# Patient Record
Sex: MALE | Race: BLACK OR AFRICAN AMERICAN | NOT HISPANIC OR LATINO | Employment: FULL TIME | URBAN - METROPOLITAN AREA
[De-identification: names, ages, dates, MRNs, and addresses within clinical notes are randomized per-mention and may not be internally consistent; named-entity substitution may affect disease eponyms.]

---

## 2023-07-21 PROBLEM — K42.9 UMBILICAL HERNIA WITHOUT OBSTRUCTION AND WITHOUT GANGRENE: Status: ACTIVE | Noted: 2023-07-21

## 2023-08-22 ENCOUNTER — TELEPHONE (OUTPATIENT)
Dept: SURGERY | Facility: CLINIC | Age: 48
End: 2023-08-22
Payer: COMMERCIAL

## 2023-08-22 NOTE — TELEPHONE ENCOUNTER
Patient called wanting to get his surgery scheduled. He stated he could not continue to support his family with this much pain. He stated he has had 4 CT scans this year and still doesn't have any answers so he is ready to schedule his hernia repair. He stated any day is good.

## 2023-08-24 NOTE — TELEPHONE ENCOUNTER
PER DR CAICEDO HE WANT'S TO KNOW WHO IS TREATING HIM FOR HIS ADRENAL MASS BEFORE SCHEDULING ANY SURGERY. SPOKE TO PATIENT'S WIFE JAVIER (ON RELASE) AND SHE STATES HIS PCP KYLIE OMALLEY HAS BEEN TREATING HIM BUT HAS SINCE REFERRED HIM OUT TO A UROLOGISTS IN [place]. SHE STATES THEY HAVE NOT BEEN CALLED FOR AN APPOINTMENT AS OF YET. SHE IS GOING TO CALL ME BACK WITH THE UROLOGISTS NAME ONCE THE APPOINTMENT IS SCHEDULED.

## 2023-08-30 PROBLEM — M16.31 OSTEOARTHRITIS RESULTING FROM RIGHT HIP DYSPLASIA: Status: ACTIVE | Noted: 2020-07-07

## 2023-08-30 RX ORDER — IBUPROFEN 800 MG/1
TABLET ORAL
COMMUNITY
Start: 2023-07-30 | End: 2023-09-11

## 2023-09-01 ENCOUNTER — OFFICE VISIT (OUTPATIENT)
Dept: UROLOGY | Facility: CLINIC | Age: 48
End: 2023-09-01
Payer: COMMERCIAL

## 2023-09-01 VITALS — BODY MASS INDEX: 34.07 KG/M2 | WEIGHT: 243.4 LBS | HEIGHT: 71 IN | RESPIRATION RATE: 16 BRPM

## 2023-09-01 DIAGNOSIS — E27.8 ADRENAL MASS: Primary | ICD-10-CM

## 2023-09-01 NOTE — PROGRESS NOTES
UROLOGY OFFICE new patient NOTE    Subjective   HPI  Steve Flood is a 48 y.o. male for assessment of adrenal mass. He is accompanied by his wife.    The patient has had a fatty mass on his adrenal gland for approximately 3 years. He has a ventral abdominal hernia that has been seen on a couple of MRIs. He was discussing surgical repair of the hernia with Dr. Ronnie Madrigal MD, who recommended work-up of the mass on his adrenal gland prior to proceeding.     Dr. Alisha Gould MD, ordered a work-up, including MRI with contrast, blood work, and 24-hour urine study, which he reports have been inconclusive.     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  He drinks a significant amount of soda.    Denies history of hypertension; not taking any antihypertensives.  Denies recent weight loss.    _________  MRI abdomen without/with contrast 8/11/2023: Normal right adrenal gland.  Left adrenal gland: Mixed T2 signal intensity, mixed T1 signal intensity nodule with large areas of T2 hyperintensity and T1 hypointensity indicating a partially cystic adrenal nodule, with a mildly thickened wall with enhancement and septations without enhancement measuring 3.3 x 3 cm without signal loss on out of phase imaging.  Indeterminate adrenal nodule.    Labs 8/29/2023  Blood cortisol: 20.7 (expected values: 3.7-19.4 (before 10 AM); 2.9-17.3 (after 5 PM)))  Testosterone 342.3  DHEA 96 ( ug/dl)  Estradiol 26.0 (11-44 pg/mL)  ACTH 87.9 (7.2-63.3 pg/mL)  Potassium 3.9  Creatinine 1.04  Lipase 20 (0-59 U/L)  Amylase 80 (25-1 25 U/L)    Hemoglobin A1c  6/2/2023: 5.6        Medical History:  History reviewed. No pertinent past medical history.     Social History:  Social History     Socioeconomic History    Marital status:    Tobacco Use    Smoking status: Every Day     Packs/day: 1.00     Years: 10.00     Pack years: 10.00     Types: Cigarettes    Smokeless tobacco: Never  "  Vaping Use    Vaping Use: Never used   Substance and Sexual Activity    Alcohol use: Not Currently     Comment: Only on special occasions    Drug use: Never    Sexual activity: Yes     Partners: Female        Family History:  Family History   Problem Relation Age of Onset    Diabetes Mother         Surgical History:  Past Surgical History:   Procedure Laterality Date    JOINT REPLACEMENT          Allergies:  No Known Allergies     Current Medications:  Current Outpatient Medications   Medication Sig Dispense Refill    ibuprofen (ADVIL,MOTRIN) 800 MG tablet TAKE 1/2 TO 1 TABLET BY MOUTH EVERY 6 HOURS AS NEEDED FOR PAIN OR FEVER       No current facility-administered medications for this visit.       Review of systems  A review of systems was performed, and positive findings are noted in the HPI.    Objective     Vital Signs:   Resp 16   Ht 180.3 cm (71\")   Wt 110 kg (243 lb 6.4 oz)   BMI 33.95 kg/m²       Physical exam  No acute distress, well-nourished  Awake alert and oriented  Mood normal; affect normal      Problem List:  Patient Active Problem List   Diagnosis    Umbilical hernia without obstruction and without gangrene    Osteoarthritis resulting from right hip dysplasia       Assessment & Plan   Diagnoses and all orders for this visit:    1. Adrenal mass (Primary)  -     Metanephrines, Frac. Free, Plasma; Future  -     Renin Direct Assay; Future  -     Aldosterone LCMS; Future      Imaging reviewed; likely will warrant surgical excision at time of ventral hernia repair.    - We discussed the patient's symptoms and treatment options. We will order additional testing today in order to determine the next steps in his treatment.    Follow-up in 1 month.   All questions and concerns have been addressed at this time.      Transcribed from ambient dictation for Lupe Russo MD by Shavonne Herman.  09/01/23   15:37 EDT    Patient or patient representative verbalized consent to the visit recording.  I have " personally performed the services described in this document as transcribed by the above individual, and it is both accurate and complete.

## 2023-09-06 ENCOUNTER — LAB (OUTPATIENT)
Dept: LAB | Facility: HOSPITAL | Age: 48
End: 2023-09-06
Payer: COMMERCIAL

## 2023-09-06 DIAGNOSIS — E27.8 ADRENAL MASS: ICD-10-CM

## 2023-09-09 ENCOUNTER — LAB (OUTPATIENT)
Dept: LAB | Facility: HOSPITAL | Age: 48
End: 2023-09-09
Payer: COMMERCIAL

## 2023-09-09 DIAGNOSIS — E27.8 ADRENAL MASS: ICD-10-CM

## 2023-09-09 PROCEDURE — 84244 ASSAY OF RENIN: CPT

## 2023-09-09 PROCEDURE — 82088 ASSAY OF ALDOSTERONE: CPT

## 2023-09-09 PROCEDURE — 36415 COLL VENOUS BLD VENIPUNCTURE: CPT

## 2023-09-09 PROCEDURE — 83835 ASSAY OF METANEPHRINES: CPT

## 2023-09-14 LAB
METANEPH FREE SERPL-MCNC: <25 PG/ML (ref 0–88)
NORMETANEPHRINE SERPL-MCNC: 45.2 PG/ML (ref 0–218.9)

## 2023-09-18 LAB
ALDOST SERPL-MCNC: 3.4 NG/DL
RENIN PLAS-CCNC: 1.63 NG/ML/HR (ref 0.17–5.38)

## 2023-09-28 ENCOUNTER — OFFICE VISIT (OUTPATIENT)
Dept: UROLOGY | Facility: CLINIC | Age: 48
End: 2023-09-28
Payer: COMMERCIAL

## 2023-09-28 VITALS
DIASTOLIC BLOOD PRESSURE: 89 MMHG | SYSTOLIC BLOOD PRESSURE: 161 MMHG | WEIGHT: 253.4 LBS | RESPIRATION RATE: 16 BRPM | HEIGHT: 71 IN | HEART RATE: 71 BPM | BODY MASS INDEX: 35.48 KG/M2

## 2023-09-28 DIAGNOSIS — E27.8 ADRENAL MASS: Primary | ICD-10-CM

## 2023-09-28 NOTE — PROGRESS NOTES
UROLOGY OFFICE follow-up NOTE    Subjective   HPI  Steve Flood is a 48 y.o. male. assessment of adrenal mass. He is accompanied by his wife.     The patient has had a fatty mass on his adrenal gland for approximately 3 years. He has a ventral abdominal hernia that has been seen on a couple of MRIs. He was discussing surgical repair of the hernia with Dr. Ronnie Madrigal MD, who recommended work-up of the mass on his adrenal gland prior to proceeding.      Dr. Alisha Gould MD, ordered a work-up, including MRI with contrast, blood work, and 24-hour urine study, which he reports have been inconclusive.      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  He drinks a significant amount of soda.     Denies history of hypertension; not taking any antihypertensives.  Denies recent weight loss.    Update 9/28/2023: Patient presents for follow-up left adrenal mass, further labs obtained prior.  Patient states he is doing well.  No complaints today.     _________  MRI abdomen without/with contrast 8/11/2023: Normal right adrenal gland.  Left adrenal gland: Mixed T2 signal intensity, mixed T1 signal intensity nodule with large areas of T2 hyperintensity and T1 hypointensity indicating a partially cystic adrenal nodule, with a mildly thickened wall with enhancement and septations without enhancement measuring 3.3 x 3 cm without signal loss on out of phase imaging.  Indeterminate adrenal nodule.     Labs 8/29/2023  Blood cortisol: 20.7 (expected values: 3.7-19.4 (before 10 AM); 2.9-17.3 (after 5 PM)))  Testosterone 342.3  DHEA 96 ( ug/dl)  Estradiol 26.0 (11-44 pg/mL)  ACTH 87.9 (7.2-63.3 pg/mL)  Potassium 3.9  Creatinine 1.04  Lipase 20 (0-59 U/L)  Amylase 80 (25-1 25 U/L)    Labs 9/9/2023  Renin 1.632 (0.167-5.38 ng/mL/h)  Aldosterone 3.4  Normetanephrine 45.2 (0.0-218.9 pg/milliliter)   metanephrine <25 (0.0-88.0 pg/mL)     Hemoglobin A1c  6/2/2023: 5.6      Review  "of systems  A review of systems was performed, and positive findings are noted in the HPI.    Objective     Vital Signs:   /89   Pulse 71   Resp 16   Ht 180.3 cm (71\")   Wt 115 kg (253 lb 6.4 oz)   BMI 35.34 kg/m²       Physical exam  No acute distress, well-nourished  Lungs: Clear, unlabored  CV: Regular rate, no chest retractions  Awake alert and oriented  Mood normal; affect normal    Problem List:  Patient Active Problem List   Diagnosis    Umbilical hernia without obstruction and without gangrene    Osteoarthritis resulting from right hip dysplasia       Assessment & Plan   Diagnoses and all orders for this visit:    1. Adrenal mass (Primary)      Left adrenal mass; imaging reviewed again with patient today in office.  Labs reviewed.  Appears to be hormonally inactive.  Given size, recommend surgical excision at time of hernia repair with general surgery.  This would be performed via robotic assisted laparoscopic left adrenalectomy.  Patient aware that his right adrenal gland appears normal.  Given normal-appearing contralateral adrenal gland, low risk for him to require any type of ongoing treatment for replacement therapy.  Risk, benefits, and alternatives of the procedure were discussed with him at length.  Risks including but not limited to bleeding, infection, damage to surrounding structure, pain, benign or malignant pathology, endocrine changes, and need for further procedures.  Patient also aware that this is a procedure performed under anesthesia which has inherent risks including and up to death.  Patient notes understanding the risks and is agreeable to proceed    Schedule OR, to coordinate scheduling with Dr. Madrigal who plans to perform umbilical hernia repair simultaneously  All questions addressed    "

## 2023-10-10 ENCOUNTER — TELEPHONE (OUTPATIENT)
Dept: UROLOGY | Facility: CLINIC | Age: 48
End: 2023-10-10
Payer: COMMERCIAL

## 2023-10-10 ENCOUNTER — TELEPHONE (OUTPATIENT)
Dept: SURGERY | Facility: CLINIC | Age: 48
End: 2023-10-10
Payer: COMMERCIAL

## 2023-10-10 ENCOUNTER — PREP FOR SURGERY (OUTPATIENT)
Dept: OTHER | Facility: HOSPITAL | Age: 48
End: 2023-10-10
Payer: COMMERCIAL

## 2023-10-10 DIAGNOSIS — K42.9 UMBILICAL HERNIA WITHOUT OBSTRUCTION AND WITHOUT GANGRENE: Primary | ICD-10-CM

## 2023-10-10 RX ORDER — SODIUM CHLORIDE 0.9 % (FLUSH) 0.9 %
10 SYRINGE (ML) INJECTION EVERY 12 HOURS SCHEDULED
OUTPATIENT
Start: 2023-10-10

## 2023-10-10 RX ORDER — CEFAZOLIN SODIUM 2 G/100ML
2000 INJECTION, SOLUTION INTRAVENOUS
OUTPATIENT
Start: 2023-10-11 | End: 2023-10-12

## 2023-10-10 RX ORDER — SODIUM CHLORIDE 9 MG/ML
40 INJECTION, SOLUTION INTRAVENOUS AS NEEDED
OUTPATIENT
Start: 2023-10-10

## 2023-10-10 RX ORDER — SODIUM CHLORIDE, SODIUM LACTATE, POTASSIUM CHLORIDE, CALCIUM CHLORIDE 600; 310; 30; 20 MG/100ML; MG/100ML; MG/100ML; MG/100ML
100 INJECTION, SOLUTION INTRAVENOUS CONTINUOUS
OUTPATIENT
Start: 2023-10-10

## 2023-10-10 RX ORDER — ONDANSETRON 2 MG/ML
4 INJECTION INTRAMUSCULAR; INTRAVENOUS EVERY 6 HOURS PRN
OUTPATIENT
Start: 2023-10-10

## 2023-10-10 RX ORDER — SODIUM CHLORIDE 0.9 % (FLUSH) 0.9 %
10 SYRINGE (ML) INJECTION AS NEEDED
OUTPATIENT
Start: 2023-10-10

## 2023-10-10 NOTE — TELEPHONE ENCOUNTER
The hub called and stated that patient wanted to have the adrenal mass dealt with when his hernia surgery is done

## 2023-10-25 ENCOUNTER — ANESTHESIA EVENT (OUTPATIENT)
Dept: PERIOP | Facility: HOSPITAL | Age: 48
End: 2023-10-25
Payer: COMMERCIAL

## 2023-10-26 NOTE — PRE-PROCEDURE INSTRUCTIONS
IMPORTANT INSTRUCTIONS - PRE-ADMISSION TESTING  DO NOT EAT OR CHEW anything after midnight the night before your procedure.    You may have CLEAR liquids up to 2 hours prior to ARRIVAL time.   Take the following medications the morning of your procedure with JUST A SIP OF WATER: INHALER PRN    DO NOT BRING your medications to the hospital with you, UNLESS something has changed since your PRE-Admission Testing appointment.  Hold all vitamins, supplements, and NSAIDS (Non- steroidal anti-inflammatory meds) for one week prior to surgery (you MAY take Tylenol or Acetaminophen).  If you are diabetic, check your blood sugar the morning of your procedure. If it is less than 70 or if you are feeling symptomatic, call the following number for further instructions: [phone].  Use your inhalers/nebulizers as usual, the morning of your procedure. BRING YOUR INHALERS with you.   Bring your CPAP or BIPAP to hospital, ONLY IF YOU WILL BE SPENDING THE NIGHT.   Make sure you have a ride home and have someone who will stay with you the day of your procedure after you go home.  If you have any questions, please call your Pre-Admission Testing NurseVENKAT at [phone].   Per anesthesia request, do not smoke for 24 hours before your procedure or as instructed by your surgeon.  Clear Liquid Diet        Find out when you need to start a clear liquid diet.   Think of “clear liquids” as anything you could read a newspaper through. This includes things like water, broth, sports drinks, or tea WITHOUT any kind of milk or cream.           Once you are told to start a clear liquid diet, only drink these things until 2 hours before arrival to the hospital or when the hospital says to stop. Total volume limitation: 8 oz.       Clear liquids you CAN drink:   Water   Clear broth: beef, chicken, vegetable, or bone broth with nothing in it   Gatorade   Lemonade or Natanael-aid   Soda   Tea, coffee (NO cream or honey)   Jell-O (without fruit)    Popsicles (without fruit or cream)   Italian ices   Juice without pulp: apple, white, grape   You may use salt, pepper, and sugar    Do NOT drink:   Milk or cream   Soy milk, almond milk, coconut milk, or other non-dairy drinks and   creamers   Milkshakes or smoothies   Tomato juice   Orange juice   Grapefruit juice   Cream soups or any other than broth         Clear Liquid Diet:  Do NOT eat any solid food.  Do NOT eat or suck on mints or candy.  Do NOT chew gum.  Do NOT drink thick liquids like milk or juice with pulp in it.  Do NOT add milk, cream, or anything like soy milk or almond milk to coffee or tea.

## 2023-10-30 ENCOUNTER — ANESTHESIA (OUTPATIENT)
Dept: PERIOP | Facility: HOSPITAL | Age: 48
End: 2023-10-30
Payer: COMMERCIAL

## 2023-10-30 ENCOUNTER — HOSPITAL ENCOUNTER (INPATIENT)
Facility: HOSPITAL | Age: 48
LOS: 1 days | Discharge: HOME | DRG: 615 | End: 2023-10-31
Attending: SURGERY | Admitting: UROLOGY
Payer: COMMERCIAL

## 2023-10-30 DIAGNOSIS — K42.9 UMBILICAL HERNIA WITHOUT OBSTRUCTION AND WITHOUT GANGRENE: ICD-10-CM

## 2023-10-30 DIAGNOSIS — E27.8 ADRENAL MASS: ICD-10-CM

## 2023-10-30 PROCEDURE — 49591 RPR AA HRN 1ST < 3 CM RDC: CPT | Performed by: SPECIALIST/TECHNOLOGIST, OTHER

## 2023-10-30 PROCEDURE — 25010000002 ESMOLOL 100 MG/10ML SOLUTION: Performed by: NURSE ANESTHETIST, CERTIFIED REGISTERED

## 2023-10-30 PROCEDURE — 25010000002 DEXAMETHASONE PER 1 MG: Performed by: NURSE ANESTHETIST, CERTIFIED REGISTERED

## 2023-10-30 PROCEDURE — 25010000002 MORPHINE PER 10 MG: Performed by: UROLOGY

## 2023-10-30 PROCEDURE — 49591 RPR AA HRN 1ST < 3 CM RDC: CPT | Performed by: SURGERY

## 2023-10-30 PROCEDURE — 25010000002 BUPIVACAINE (PF) 0.25 % SOLUTION 10 ML VIAL: Performed by: SURGERY

## 2023-10-30 PROCEDURE — 0WUF4JZ SUPPLEMENT ABDOMINAL WALL WITH SYNTHETIC SUBSTITUTE, PERCUTANEOUS ENDOSCOPIC APPROACH: ICD-10-PCS | Performed by: SURGERY

## 2023-10-30 PROCEDURE — 25810000003 LACTATED RINGERS PER 1000 ML: Performed by: UROLOGY

## 2023-10-30 PROCEDURE — 25010000002 MIDAZOLAM PER 1MG: Performed by: ANESTHESIOLOGY

## 2023-10-30 PROCEDURE — 88307 TISSUE EXAM BY PATHOLOGIST: CPT | Performed by: UROLOGY

## 2023-10-30 PROCEDURE — 25010000002 PROPOFOL 10 MG/ML EMULSION: Performed by: NURSE ANESTHETIST, CERTIFIED REGISTERED

## 2023-10-30 PROCEDURE — 94799 UNLISTED PULMONARY SVC/PX: CPT

## 2023-10-30 PROCEDURE — 25010000002 ONDANSETRON PER 1 MG: Performed by: NURSE ANESTHETIST, CERTIFIED REGISTERED

## 2023-10-30 PROCEDURE — 25010000002 SUGAMMADEX 200 MG/2ML SOLUTION: Performed by: NURSE ANESTHETIST, CERTIFIED REGISTERED

## 2023-10-30 PROCEDURE — 25010000002 MEPERIDINE PER 100 MG: Performed by: NURSE ANESTHETIST, CERTIFIED REGISTERED

## 2023-10-30 PROCEDURE — 0 HYDROMORPHONE 1 MG/ML SOLUTION: Performed by: NURSE ANESTHETIST, CERTIFIED REGISTERED

## 2023-10-30 PROCEDURE — 25810000003 LACTATED RINGERS PER 1000 ML: Performed by: ANESTHESIOLOGY

## 2023-10-30 PROCEDURE — 25010000002 HYDROMORPHONE 1 MG/ML SOLUTION: Performed by: NURSE ANESTHETIST, CERTIFIED REGISTERED

## 2023-10-30 PROCEDURE — 94761 N-INVAS EAR/PLS OXIMETRY MLT: CPT

## 2023-10-30 PROCEDURE — 25010000002 FENTANYL CITRATE (PF) 50 MCG/ML SOLUTION: Performed by: NURSE ANESTHETIST, CERTIFIED REGISTERED

## 2023-10-30 PROCEDURE — 8E0W4CZ ROBOTIC ASSISTED PROCEDURE OF TRUNK REGION, PERCUTANEOUS ENDOSCOPIC APPROACH: ICD-10-PCS | Performed by: UROLOGY

## 2023-10-30 PROCEDURE — 25010000002 CEFAZOLIN IN DEXTROSE 2000 MG/ 100 ML SOLUTION: Performed by: SURGERY

## 2023-10-30 PROCEDURE — C1781 MESH (IMPLANTABLE): HCPCS | Performed by: SURGERY

## 2023-10-30 PROCEDURE — 25010000002 ONDANSETRON PER 1 MG: Performed by: UROLOGY

## 2023-10-30 PROCEDURE — S2900 ROBOTIC SURGICAL SYSTEM: HCPCS | Performed by: SURGERY

## 2023-10-30 PROCEDURE — 60650 LAPAROSCOPY ADRENALECTOMY: CPT | Performed by: UROLOGY

## 2023-10-30 PROCEDURE — 0GT24ZZ RESECTION OF LEFT ADRENAL GLAND, PERCUTANEOUS ENDOSCOPIC APPROACH: ICD-10-PCS | Performed by: UROLOGY

## 2023-10-30 PROCEDURE — 25010000002 KETOROLAC TROMETHAMINE PER 15 MG: Performed by: UROLOGY

## 2023-10-30 DEVICE — ABSORBABLE WOUND CLOSURE DEVICE
Type: IMPLANTABLE DEVICE | Site: ABDOMEN | Status: FUNCTIONAL
Brand: V-LOC 180

## 2023-10-30 DEVICE — CLIP LIG HEMOLOK PA 6CT MD/LG GRN: Type: IMPLANTABLE DEVICE | Site: ABDOMEN | Status: FUNCTIONAL

## 2023-10-30 DEVICE — SEAL HEMO SURG ARISTA/AH ABS/PWDR 3GM: Type: IMPLANTABLE DEVICE | Site: ABDOMEN | Status: FUNCTIONAL

## 2023-10-30 DEVICE — PHASIX ST MESH, CIRCLE
Type: IMPLANTABLE DEVICE | Site: ABDOMEN | Status: FUNCTIONAL
Brand: PHASIX ST MESH

## 2023-10-30 RX ORDER — ONDANSETRON 2 MG/ML
4 INJECTION INTRAMUSCULAR; INTRAVENOUS ONCE AS NEEDED
Status: DISCONTINUED | OUTPATIENT
Start: 2023-10-30 | End: 2023-10-30 | Stop reason: HOSPADM

## 2023-10-30 RX ORDER — OXYCODONE HYDROCHLORIDE 5 MG/1
5 TABLET ORAL
Status: DISCONTINUED | OUTPATIENT
Start: 2023-10-30 | End: 2023-10-30 | Stop reason: HOSPADM

## 2023-10-30 RX ORDER — FENTANYL CITRATE 50 UG/ML
INJECTION, SOLUTION INTRAMUSCULAR; INTRAVENOUS AS NEEDED
Status: DISCONTINUED | OUTPATIENT
Start: 2023-10-30 | End: 2023-10-30 | Stop reason: SURG

## 2023-10-30 RX ORDER — ROCURONIUM BROMIDE 10 MG/ML
INJECTION, SOLUTION INTRAVENOUS AS NEEDED
Status: DISCONTINUED | OUTPATIENT
Start: 2023-10-30 | End: 2023-10-30 | Stop reason: SURG

## 2023-10-30 RX ORDER — PHENYLEPHRINE HCL IN 0.9% NACL 1 MG/10 ML
SYRINGE (ML) INTRAVENOUS AS NEEDED
Status: DISCONTINUED | OUTPATIENT
Start: 2023-10-30 | End: 2023-10-30 | Stop reason: SURG

## 2023-10-30 RX ORDER — NICOTINE 21 MG/24HR
1 PATCH, TRANSDERMAL 24 HOURS TRANSDERMAL
Status: DISCONTINUED | OUTPATIENT
Start: 2023-10-31 | End: 2023-10-30

## 2023-10-30 RX ORDER — SODIUM CHLORIDE 0.9 % (FLUSH) 0.9 %
10 SYRINGE (ML) INJECTION AS NEEDED
Status: DISCONTINUED | OUTPATIENT
Start: 2023-10-30 | End: 2023-10-31 | Stop reason: HOSPADM

## 2023-10-30 RX ORDER — ONDANSETRON 2 MG/ML
4 INJECTION INTRAMUSCULAR; INTRAVENOUS EVERY 6 HOURS PRN
Status: DISCONTINUED | OUTPATIENT
Start: 2023-10-30 | End: 2023-10-31 | Stop reason: HOSPADM

## 2023-10-30 RX ORDER — SODIUM CHLORIDE 9 MG/ML
40 INJECTION, SOLUTION INTRAVENOUS AS NEEDED
Status: DISCONTINUED | OUTPATIENT
Start: 2023-10-30 | End: 2023-10-30 | Stop reason: HOSPADM

## 2023-10-30 RX ORDER — SODIUM CHLORIDE 0.9 % (FLUSH) 0.9 %
10 SYRINGE (ML) INJECTION EVERY 12 HOURS SCHEDULED
Status: DISCONTINUED | OUTPATIENT
Start: 2023-10-30 | End: 2023-10-30 | Stop reason: HOSPADM

## 2023-10-30 RX ORDER — SODIUM CHLORIDE 9 MG/ML
40 INJECTION, SOLUTION INTRAVENOUS AS NEEDED
Status: DISCONTINUED | OUTPATIENT
Start: 2023-10-30 | End: 2023-10-31 | Stop reason: HOSPADM

## 2023-10-30 RX ORDER — LIDOCAINE HYDROCHLORIDE 20 MG/ML
INJECTION, SOLUTION EPIDURAL; INFILTRATION; INTRACAUDAL; PERINEURAL AS NEEDED
Status: DISCONTINUED | OUTPATIENT
Start: 2023-10-30 | End: 2023-10-30 | Stop reason: SURG

## 2023-10-30 RX ORDER — SODIUM CHLORIDE, SODIUM LACTATE, POTASSIUM CHLORIDE, CALCIUM CHLORIDE 600; 310; 30; 20 MG/100ML; MG/100ML; MG/100ML; MG/100ML
9 INJECTION, SOLUTION INTRAVENOUS CONTINUOUS PRN
Status: DISCONTINUED | OUTPATIENT
Start: 2023-10-30 | End: 2023-10-31 | Stop reason: HOSPADM

## 2023-10-30 RX ORDER — NICOTINE 21 MG/24HR
1 PATCH, TRANSDERMAL 24 HOURS TRANSDERMAL
Status: DISCONTINUED | OUTPATIENT
Start: 2023-10-30 | End: 2023-10-31 | Stop reason: HOSPADM

## 2023-10-30 RX ORDER — SODIUM CHLORIDE 0.9 % (FLUSH) 0.9 %
10 SYRINGE (ML) INJECTION AS NEEDED
Status: DISCONTINUED | OUTPATIENT
Start: 2023-10-30 | End: 2023-10-30 | Stop reason: HOSPADM

## 2023-10-30 RX ORDER — MIDAZOLAM HYDROCHLORIDE 2 MG/2ML
2 INJECTION, SOLUTION INTRAMUSCULAR; INTRAVENOUS ONCE
Status: COMPLETED | OUTPATIENT
Start: 2023-10-30 | End: 2023-10-30

## 2023-10-30 RX ORDER — PROPOFOL 10 MG/ML
VIAL (ML) INTRAVENOUS AS NEEDED
Status: DISCONTINUED | OUTPATIENT
Start: 2023-10-30 | End: 2023-10-30 | Stop reason: SURG

## 2023-10-30 RX ORDER — CEFAZOLIN SODIUM 2 G/100ML
2000 INJECTION, SOLUTION INTRAVENOUS
Qty: 100 ML | Refills: 0 | Status: COMPLETED | OUTPATIENT
Start: 2023-10-30 | End: 2023-10-30

## 2023-10-30 RX ORDER — MEPERIDINE HYDROCHLORIDE 25 MG/ML
12.5 INJECTION INTRAMUSCULAR; INTRAVENOUS; SUBCUTANEOUS
Status: DISCONTINUED | OUTPATIENT
Start: 2023-10-30 | End: 2023-10-30 | Stop reason: HOSPADM

## 2023-10-30 RX ORDER — NALOXONE HCL 0.4 MG/ML
0.4 VIAL (ML) INJECTION
Status: DISCONTINUED | OUTPATIENT
Start: 2023-10-30 | End: 2023-10-31 | Stop reason: HOSPADM

## 2023-10-30 RX ORDER — PROMETHAZINE HYDROCHLORIDE 25 MG/1
25 SUPPOSITORY RECTAL ONCE AS NEEDED
Status: DISCONTINUED | OUTPATIENT
Start: 2023-10-30 | End: 2023-10-30 | Stop reason: HOSPADM

## 2023-10-30 RX ORDER — OXYCODONE AND ACETAMINOPHEN 10; 325 MG/1; MG/1
1 TABLET ORAL EVERY 4 HOURS PRN
Status: DISCONTINUED | OUTPATIENT
Start: 2023-10-30 | End: 2023-10-31 | Stop reason: HOSPADM

## 2023-10-30 RX ORDER — ACETAMINOPHEN 650 MG/1
650 SUPPOSITORY RECTAL EVERY 4 HOURS PRN
Status: DISCONTINUED | OUTPATIENT
Start: 2023-10-30 | End: 2023-10-31 | Stop reason: HOSPADM

## 2023-10-30 RX ORDER — ESMOLOL HYDROCHLORIDE 10 MG/ML
INJECTION INTRAVENOUS AS NEEDED
Status: DISCONTINUED | OUTPATIENT
Start: 2023-10-30 | End: 2023-10-30 | Stop reason: SURG

## 2023-10-30 RX ORDER — DOCUSATE SODIUM 100 MG/1
100 CAPSULE, LIQUID FILLED ORAL 2 TIMES DAILY PRN
Status: DISCONTINUED | OUTPATIENT
Start: 2023-10-30 | End: 2023-10-31 | Stop reason: HOSPADM

## 2023-10-30 RX ORDER — ONDANSETRON 2 MG/ML
INJECTION INTRAMUSCULAR; INTRAVENOUS AS NEEDED
Status: DISCONTINUED | OUTPATIENT
Start: 2023-10-30 | End: 2023-10-30 | Stop reason: SURG

## 2023-10-30 RX ORDER — ONDANSETRON 4 MG/1
4 TABLET, FILM COATED ORAL EVERY 6 HOURS PRN
Status: DISCONTINUED | OUTPATIENT
Start: 2023-10-30 | End: 2023-10-31 | Stop reason: HOSPADM

## 2023-10-30 RX ORDER — MAGNESIUM HYDROXIDE 1200 MG/15ML
LIQUID ORAL AS NEEDED
Status: DISCONTINUED | OUTPATIENT
Start: 2023-10-30 | End: 2023-10-30 | Stop reason: HOSPADM

## 2023-10-30 RX ORDER — OXYCODONE HYDROCHLORIDE AND ACETAMINOPHEN 5; 325 MG/1; MG/1
1 TABLET ORAL EVERY 4 HOURS PRN
Status: DISCONTINUED | OUTPATIENT
Start: 2023-10-30 | End: 2023-10-31 | Stop reason: HOSPADM

## 2023-10-30 RX ORDER — PROMETHAZINE HYDROCHLORIDE 12.5 MG/1
25 TABLET ORAL ONCE AS NEEDED
Status: DISCONTINUED | OUTPATIENT
Start: 2023-10-30 | End: 2023-10-30 | Stop reason: HOSPADM

## 2023-10-30 RX ORDER — ACETAMINOPHEN 325 MG/1
650 TABLET ORAL EVERY 4 HOURS PRN
Status: DISCONTINUED | OUTPATIENT
Start: 2023-10-30 | End: 2023-10-31 | Stop reason: HOSPADM

## 2023-10-30 RX ORDER — ALBUTEROL SULFATE 90 UG/1
2 AEROSOL, METERED RESPIRATORY (INHALATION) EVERY 4 HOURS PRN
Status: DISCONTINUED | OUTPATIENT
Start: 2023-10-30 | End: 2023-10-31 | Stop reason: HOSPADM

## 2023-10-30 RX ORDER — SODIUM CHLORIDE, SODIUM LACTATE, POTASSIUM CHLORIDE, CALCIUM CHLORIDE 600; 310; 30; 20 MG/100ML; MG/100ML; MG/100ML; MG/100ML
100 INJECTION, SOLUTION INTRAVENOUS CONTINUOUS
Status: DISCONTINUED | OUTPATIENT
Start: 2023-10-30 | End: 2023-10-31

## 2023-10-30 RX ORDER — IBUPROFEN 800 MG/1
800 TABLET ORAL EVERY 8 HOURS PRN
COMMUNITY
Start: 2023-10-17

## 2023-10-30 RX ORDER — MORPHINE SULFATE 2 MG/ML
2 INJECTION, SOLUTION INTRAMUSCULAR; INTRAVENOUS
Status: DISCONTINUED | OUTPATIENT
Start: 2023-10-30 | End: 2023-10-31 | Stop reason: HOSPADM

## 2023-10-30 RX ORDER — GLYCOPYRROLATE 0.2 MG/ML
INJECTION INTRAMUSCULAR; INTRAVENOUS AS NEEDED
Status: DISCONTINUED | OUTPATIENT
Start: 2023-10-30 | End: 2023-10-30 | Stop reason: SURG

## 2023-10-30 RX ORDER — SODIUM CHLORIDE 0.9 % (FLUSH) 0.9 %
10 SYRINGE (ML) INJECTION EVERY 12 HOURS SCHEDULED
Status: DISCONTINUED | OUTPATIENT
Start: 2023-10-30 | End: 2023-10-31 | Stop reason: HOSPADM

## 2023-10-30 RX ORDER — ALBUTEROL SULFATE 90 UG/1
2 AEROSOL, METERED RESPIRATORY (INHALATION) EVERY 4 HOURS PRN
COMMUNITY

## 2023-10-30 RX ORDER — ACETAMINOPHEN 500 MG
1000 TABLET ORAL ONCE
Status: COMPLETED | OUTPATIENT
Start: 2023-10-30 | End: 2023-10-30

## 2023-10-30 RX ORDER — DEXMEDETOMIDINE HYDROCHLORIDE 100 UG/ML
INJECTION, SOLUTION INTRAVENOUS AS NEEDED
Status: DISCONTINUED | OUTPATIENT
Start: 2023-10-30 | End: 2023-10-30 | Stop reason: SURG

## 2023-10-30 RX ORDER — DEXAMETHASONE SODIUM PHOSPHATE 4 MG/ML
INJECTION, SOLUTION INTRA-ARTICULAR; INTRALESIONAL; INTRAMUSCULAR; INTRAVENOUS; SOFT TISSUE AS NEEDED
Status: DISCONTINUED | OUTPATIENT
Start: 2023-10-30 | End: 2023-10-30 | Stop reason: SURG

## 2023-10-30 RX ORDER — KETOROLAC TROMETHAMINE 15 MG/ML
15 INJECTION, SOLUTION INTRAMUSCULAR; INTRAVENOUS EVERY 6 HOURS PRN
Status: DISCONTINUED | OUTPATIENT
Start: 2023-10-30 | End: 2023-10-31 | Stop reason: HOSPADM

## 2023-10-30 RX ADMIN — FENTANYL CITRATE 100 MCG: 50 INJECTION, SOLUTION INTRAMUSCULAR; INTRAVENOUS at 19:20

## 2023-10-30 RX ADMIN — ROCURONIUM BROMIDE 30 MG: 50 INJECTION INTRAVENOUS at 17:01

## 2023-10-30 RX ADMIN — MEPERIDINE HYDROCHLORIDE 12.5 MG: 25 INJECTION INTRAMUSCULAR; INTRAVENOUS; SUBCUTANEOUS at 19:42

## 2023-10-30 RX ADMIN — LIDOCAINE HYDROCHLORIDE 50 MG: 20 INJECTION, SOLUTION EPIDURAL; INFILTRATION; INTRACAUDAL; PERINEURAL at 15:17

## 2023-10-30 RX ADMIN — FENTANYL CITRATE 50 MCG: 50 INJECTION, SOLUTION INTRAMUSCULAR; INTRAVENOUS at 16:17

## 2023-10-30 RX ADMIN — ESMOLOL HYDROCHLORIDE 20 MG: 100 INJECTION, SOLUTION INTRAVENOUS at 18:25

## 2023-10-30 RX ADMIN — NICOTINE 1 PATCH: 21 PATCH, EXTENDED RELEASE TRANSDERMAL at 23:25

## 2023-10-30 RX ADMIN — HYDROMORPHONE HYDROCHLORIDE 0.5 MG: 1 INJECTION, SOLUTION INTRAMUSCULAR; INTRAVENOUS; SUBCUTANEOUS at 19:42

## 2023-10-30 RX ADMIN — ONDANSETRON 4 MG: 2 INJECTION INTRAMUSCULAR; INTRAVENOUS at 19:00

## 2023-10-30 RX ADMIN — PROPOFOL 200 MG: 10 INJECTION, EMULSION INTRAVENOUS at 15:17

## 2023-10-30 RX ADMIN — DEXMEDETOMIDINE 20 MCG: 100 INJECTION, SOLUTION INTRAVENOUS at 16:49

## 2023-10-30 RX ADMIN — HYDROMORPHONE HYDROCHLORIDE 0.5 MG: 1 INJECTION, SOLUTION INTRAMUSCULAR; INTRAVENOUS; SUBCUTANEOUS at 15:18

## 2023-10-30 RX ADMIN — HYDROMORPHONE HYDROCHLORIDE 0.25 MG: 1 INJECTION, SOLUTION INTRAMUSCULAR; INTRAVENOUS; SUBCUTANEOUS at 20:30

## 2023-10-30 RX ADMIN — Medication 200 MCG: at 15:40

## 2023-10-30 RX ADMIN — SODIUM CHLORIDE, POTASSIUM CHLORIDE, SODIUM LACTATE AND CALCIUM CHLORIDE 9 ML/HR: 600; 310; 30; 20 INJECTION, SOLUTION INTRAVENOUS at 12:02

## 2023-10-30 RX ADMIN — ESMOLOL HYDROCHLORIDE 20 MG: 100 INJECTION, SOLUTION INTRAVENOUS at 17:08

## 2023-10-30 RX ADMIN — ROCURONIUM BROMIDE 20 MG: 50 INJECTION INTRAVENOUS at 18:00

## 2023-10-30 RX ADMIN — DEXAMETHASONE SODIUM PHOSPHATE 8 MG: 4 INJECTION, SOLUTION INTRAMUSCULAR; INTRAVENOUS at 15:35

## 2023-10-30 RX ADMIN — SODIUM CHLORIDE, POTASSIUM CHLORIDE, SODIUM LACTATE AND CALCIUM CHLORIDE 100 ML/HR: 600; 310; 30; 20 INJECTION, SOLUTION INTRAVENOUS at 23:26

## 2023-10-30 RX ADMIN — MORPHINE SULFATE 2 MG: 2 INJECTION, SOLUTION INTRAMUSCULAR; INTRAVENOUS at 21:08

## 2023-10-30 RX ADMIN — GLYCOPYRROLATE 0.2 MG: 0.2 INJECTION INTRAMUSCULAR; INTRAVENOUS at 15:16

## 2023-10-30 RX ADMIN — ONDANSETRON 4 MG: 2 INJECTION INTRAMUSCULAR; INTRAVENOUS at 21:12

## 2023-10-30 RX ADMIN — ROCURONIUM BROMIDE 30 MG: 50 INJECTION INTRAVENOUS at 15:56

## 2023-10-30 RX ADMIN — CEFAZOLIN SODIUM 2000 MG: 2 INJECTION, SOLUTION INTRAVENOUS at 19:15

## 2023-10-30 RX ADMIN — ACETAMINOPHEN 1000 MG: 500 TABLET ORAL at 12:02

## 2023-10-30 RX ADMIN — MIDAZOLAM HYDROCHLORIDE 2 MG: 1 INJECTION, SOLUTION INTRAMUSCULAR; INTRAVENOUS at 14:37

## 2023-10-30 RX ADMIN — FENTANYL CITRATE 50 MCG: 50 INJECTION, SOLUTION INTRAMUSCULAR; INTRAVENOUS at 16:44

## 2023-10-30 RX ADMIN — KETOROLAC TROMETHAMINE 15 MG: 15 INJECTION, SOLUTION INTRAMUSCULAR; INTRAVENOUS at 23:26

## 2023-10-30 RX ADMIN — SUGAMMADEX 200 MG: 100 INJECTION, SOLUTION INTRAVENOUS at 19:01

## 2023-10-30 RX ADMIN — HYDROMORPHONE HYDROCHLORIDE 0.5 MG: 1 INJECTION, SOLUTION INTRAMUSCULAR; INTRAVENOUS; SUBCUTANEOUS at 19:50

## 2023-10-30 RX ADMIN — FENTANYL CITRATE 100 MCG: 50 INJECTION, SOLUTION INTRAMUSCULAR; INTRAVENOUS at 18:51

## 2023-10-30 RX ADMIN — CEFAZOLIN SODIUM 2000 MG: 2 INJECTION, SOLUTION INTRAVENOUS at 15:15

## 2023-10-30 RX ADMIN — ROCURONIUM BROMIDE 70 MG: 50 INJECTION INTRAVENOUS at 15:17

## 2023-10-30 RX ADMIN — DEXMEDETOMIDINE 30 MCG: 100 INJECTION, SOLUTION INTRAVENOUS at 17:06

## 2023-10-30 RX ADMIN — Medication 10 ML: at 23:26

## 2023-10-30 NOTE — OP NOTE
Preoperative diagnosis  Left adrenal mass    Postoperative diagnosis  Left adrenal mass    Procedure performed  Left robotic assisted laparoscopic adrenalectomy    Attending surgeon  Lupe Russo MD     Assistant  Tod Parr RN    Anesthesia  General    EBL  25 mL    Complications  None    Specimen  Left adrenal    Findings  Left adrenal gland with adjacent cystic structure with visible adrenal tissue; completely excised    Indications  48 y.o. male agreed to undergo the above named procedure after discussion of the alternatives, risks and benefits.   Informed consent was obtained.      Procedure  The patient properly identified, brought to the Operating Room. Following induction of general anesthesia, the patient was placed in the left flank position at the intersection to the twelfth rib. At the umbilicus, a small vertical incision was made with an 11 blade scalpel, through which we gained pneumoperitoneum with help of a Veress needle. A drop test was used to confirm its proper placement. Visual trocar with 0 degree laparoscopic lens was used to obtain access into the abdomen. There was no evidence of bowel or vascular injury. There were no adhesions and careful inspection was performed prior to insertion of the ports. In a triangular fashion towards the left upper quadrant, two 8 millimeter robot trocars were placed. In the mid-line just above the camera port, a 5 millimeter assistant port was placed. We docked the robot using a 30 degree  lens. The colon was mobilized along the white line of Toldt. Once this was reflected, identification of the kidney and adrenal gland was made. The adrenal gland with a large cystic structure was circumferentially dissected until freed.  The adrenal vein was controlled with the vessel sealer and clips.  It was then placed in an endocatch bag. Repeated inspection of the hilum, there was adequate hemostasis after observing for several minutes; the field was covered with  Elo hemostatic agent.  The robot was then undocked.   The case was then passed to the general surgery team, Dr. Madrigal, who will extract the specimen and perform robotic umbilical hernia repair.  Please see subsequent operative report for details of the remainder of the procedure.   All needle and sponge counts were correct x 2 for this portion of the procedure.     The first assist, Tod Parr RN, was present and actively participated throughout the case.          Signed:  Lupe Russo MD  10/30/23  17:44 EDT

## 2023-10-30 NOTE — ANESTHESIA POSTPROCEDURE EVALUATION
Patient: Steve Flood    Procedure Summary       Date: 10/30/23 Room / Location: [facility] OR 08 / [facility] MAIN OR    Anesthesia Start: 1507 Anesthesia Stop: 1938    Procedures:       UMBILICAL HERNIA REPAIR LAPAROSCOPIC WITH DAVINCI ROBOT (Abdomen)      ADRENALECTOMY LAPAROSCOPIC WITH DAVINCI ROBOT (Left: Abdomen) Diagnosis:       Umbilical hernia without obstruction and without gangrene      (Umbilical hernia without obstruction and without gangrene [K42.9])    Surgeons: Ronnie Madrigal MD; Lupe Russo MD Provider: Radha Regalado CRNA    Anesthesia Type: general ASA Status: 2            Anesthesia Type: general    Vitals  Vitals Value Taken Time   /88 10/30/23 1945   Temp 36.2 °C (97.1 °F) 10/30/23 1930   Pulse 77 10/30/23 1949   Resp 12 10/30/23 1945   SpO2 96 % 10/30/23 1946   Vitals shown include unfiled device data.        Post Anesthesia Care and Evaluation    Patient location during evaluation: bedside  Patient participation: complete - patient participated  Level of consciousness: awake  Pain management: adequate    Airway patency: patent  PONV Status: none  Cardiovascular status: acceptable and stable  Respiratory status: acceptable  Hydration status: acceptable    Comments: An Anesthesiologist personally participated in the most demanding procedures (including induction and emergence if applicable) in the anesthesia plan, monitored the course of anesthesia administration at frequent intervals and remained physically present and available for immediate diagnosis and treatment of emergencies.

## 2023-10-30 NOTE — OP NOTE
Preoperative diagnosis: Umbilical hernia    Postoperative diagnosis: Same    Findings: 2 cm umbilical hernia    Procedure: Robotic umbilical hernia repair    Surgeon: Rohan    Anesthesia: General    Assistant: Valerie Alvarez    EBL: 3 mL from our portion of the case    Specimens: None    Complications: None    Indications: 48-year-old male who presented with an umbilical hernia as well as a adrenal mass.  Presents today for elective hernia repair and adrenalectomy.    PROCEDURE    Patient was seen in the preoperative holding area.  Risks, benefits, alternatives of the operation were again discussed in detail.  All of the patient and family's questions were answered.  They voiced understanding, and agreed to proceed.    Patient was brought to the operating room.  Monitoring devices and [place] stockings were placed.  Anesthesia was administered.  Patient was prepped and draped in standard surgical fashion.  After prepping and draping a timeout was performed to verify both the correct patient and correct procedure.    We assumed the case from Dr. Russo after adrenalectomy had been performed.  We were able to use 2 of her trocars for the robotic arms but I did have to reposition the camera port because it was too close to the hernia defect.  After injection of local anesthesia under direct visualization an 8 mm trocar was placed in the left lateral side and this was repositioned at one other time to make space for the camera and the mesh.    We then brought in and docked the robot.  Once the robot was in appropriate position, we began by reducing any contents that were incarcerated in the umbilical hernia.  We then dissected into the preperitoneal space, making sure we reduced all contents from the umbilical hernia.  Once everything was completely reduced from the umbilical hernia and we had a complete and adequate exposure, we closed the hernia defect primarily with a running 0 V-Loc suture.  Once the defect was  closed we then brought in a piece of phasix ST mesh.  It was [place] centimeters.  A chandelier stitch had been placed in the mesh, the chandelier stitch was grasped with a Reymundo-Glen device through the center of the umbilical defect and pulled up flush with the anterior abdominal wall.  The mesh was then sewn into place circumferentially with multiple 3-0 V-Loc sutures.  We inspected the mesh to make sure it was sewn in 360 degrees with no gaps or folds.  Satisfied with the primary repair and coverage with the mesh, we then inspected the abdomen making sure everything appeared to be hemostatic and there were no obvious underlying injuries.  We then removed the robotic arms and undocked the robot.    Dr. Russo had left the specimen in situ.  I remove the specimen through the left upper quadrant trocar.  I did have to extend the incision slightly in order to get the specimen out.  Specimen came out tacked in the bag.  Specimen was passed off for pathology.  We closed this site and a couple of the 8 mm trocar sites with a Reymundo-Glen device and 0 Vicryl suture under direct visualization.  The remaining trocars were then removed under direct visualization, and the abdomen was desufflated.  Skin incisions were closed with subcuticular 4-0 Vicryl stitch and dressed with skin glue.    The patient was then aroused from anesthesia, taken off the OR table, and taken to PACU in stable condition.  Sponge, needle, and instrument counts were correct x2.  Valerie Alvarez was present for the entire procedure and helped in all portions of the case.    Assistant: Tod Parr RN CSA; Valerie Alvarez CSA was responsible for performing the following activities: Retraction, Suturing, Closing, Placing Dressing, and Held/Positioned Camera and their skilled assistance was necessary for the success of this case.        The operative note was dictated with the help of the dragon dictation system.

## 2023-10-30 NOTE — PROGRESS NOTES
Plan to perform left robotic adrenalectomy in combination with Dr. Madrigal.  Please see surgery H&P for details.  Risks, benefits, and alternatives were discussed with him at length prior to today.  Adequate time was provided for questions.  Hormonal work-up was performed and was negative.  Patient aware that his right adrenal gland appears normal.  Given normal-appearing contralateral adrenal gland, low risk for him to require any type of ongoing treatment for replacement therapy.  Risks discussed including but not limited to bleeding, infection, damage to surrounding structure, pain, benign or malignant pathology, endocrine changes, and need for further procedures.  Patient also aware that this is a procedure performed under anesthesia which has inherent risks including and up to death.  Patient notes understanding the risks and is agreeable to proceed.  All questions addressed

## 2023-10-30 NOTE — H&P
"Steve Flood is a 48 y.o. male presents to [facility] GENERAL SURGERY. The patient is to be seen for umbilical hernia.     The patient is to be seen for a large umbilical hernia on CT.     Umbilical hernia  The patient reports he has a large umbilical hernia. He states he was told it was a 3 cm mass on his right side. The patient reports he has been dealing with this pain. He states he is trying to get another CT scan with it. The patient reports he is more worried about that than the hernia. He states he has seen Dr. Gould for his adrenal mass. The patient reports he has an appointment scheduled on 08/01/2023 to get an MRI. He states he saw a surgeon in [place] and was informed that surgery would be 4 to 6 weeks at the earliest. The patient reports he took the MRI and they saw the hernia. He states they were going to call him with scheduling for everything, but the hernia was taking an number of weeks. The patient reports he was told he was seeing someone at [facility] for the adrenal masses.           Objective   Medical History   History reviewed. No pertinent past medical history.        Surgical History         Past Surgical History:   Procedure Laterality Date    JOINT REPLACEMENT                No current outpatient medications on file.     No Known Allergies            Family History   Problem Relation Age of Onset    Diabetes Mother           Social History   Social History            Socioeconomic History    Marital status:    Tobacco Use    Smoking status: Every Day       Packs/day: 1.00       Years: 10.00       Pack years: 10.00       Types: Cigarettes    Smokeless tobacco: Never   Vaping Use    Vaping Use: Never used   Substance and Sexual Activity    Alcohol use: Not Currently       Comment: Only on special occasions    Drug use: Never    Sexual activity: Yes       Partners: Female            Vital Signs:   Resp 16   Ht 177.8 cm (70\")   BMI 35.02 kg/m²    "   Physical Exam  Constitutional:       General: He is not in acute distress.  Pulmonary:      Breath sounds: Normal breath sounds.   Abdominal:      Palpations: Abdomen is soft.         It likely has an inferior to the umbilicus hernia. Given body habitus, it actually was not clear. I could not clearly feel a umbilical hernia, but I did feel like he had some bulging just inferior to the umbilicus, but his umbilicus actually appeared normal, but he does have a CT which shows a large fat containing umbilical hernia that was from [facility].           Result Review   :               Procedures            Assessment   Assessment and Plan    There are no diagnoses linked to this encounter.     1. Patient with a symptomatic umbilical hernia.     - Patient has a fairly large 3 cm adrenal mass and I am not sure how this is being addressed or worked up. We will need to get in touch with his PCP and see if they have made any referrals for this adrenal mass. I think if there is plans to operate on the adrenal mass, we could potentially do his umbilical hernia repair in conjunction with the adrenalectomy, but he likely needs some work-up for that prior to getting him scheduled for an umbilical hernia repair. Risks, benefits, alternatives of the umbilical hernia repair were discussed extensively. All questions were answered. Patient voiced understanding and agreed to proceed with the above plan.        Follow Up   No follow-ups on file.  Patient was given instructions and counseling regarding his condition or for health maintenance advice. Please see specific information pulled into the AVS if appropriate.         Transcribed from ambient dictation for Ronnie Madrigal MD by Ludwin Canchola.  07/20/23   12:38 EDT     Patient or patient representative verbalized consent to the visit recording.  I have personally performed the services described in this document as transcribed by the above individual, and it is both  accurate and complete.

## 2023-10-31 ENCOUNTER — READMISSION MANAGEMENT (OUTPATIENT)
Dept: CALL CENTER | Facility: HOSPITAL | Age: 48
End: 2023-10-31
Payer: COMMERCIAL

## 2023-10-31 VITALS
WEIGHT: 258.6 LBS | HEART RATE: 55 BPM | OXYGEN SATURATION: 98 % | SYSTOLIC BLOOD PRESSURE: 143 MMHG | RESPIRATION RATE: 16 BRPM | DIASTOLIC BLOOD PRESSURE: 76 MMHG | HEIGHT: 72 IN | TEMPERATURE: 98.1 F | BODY MASS INDEX: 35.03 KG/M2

## 2023-10-31 LAB
ANION GAP SERPL CALCULATED.3IONS-SCNC: 7.5 MMOL/L (ref 5–15)
BASOPHILS # BLD AUTO: 0.01 10*3/MM3 (ref 0–0.2)
BASOPHILS NFR BLD AUTO: 0.1 % (ref 0–1.5)
BUN SERPL-MCNC: 10 MG/DL (ref 6–20)
BUN/CREAT SERPL: 10.6 (ref 7–25)
CALCIUM SPEC-SCNC: 8.8 MG/DL (ref 8.6–10.5)
CHLORIDE SERPL-SCNC: 100 MMOL/L (ref 98–107)
CO2 SERPL-SCNC: 27.5 MMOL/L (ref 22–29)
CREAT SERPL-MCNC: 0.94 MG/DL (ref 0.76–1.27)
DEPRECATED RDW RBC AUTO: 48.8 FL (ref 37–54)
EGFRCR SERPLBLD CKD-EPI 2021: 100.6 ML/MIN/1.73
EOSINOPHIL # BLD AUTO: 0 10*3/MM3 (ref 0–0.4)
EOSINOPHIL NFR BLD AUTO: 0 % (ref 0.3–6.2)
ERYTHROCYTE [DISTWIDTH] IN BLOOD BY AUTOMATED COUNT: 13.8 % (ref 12.3–15.4)
GLUCOSE SERPL-MCNC: 98 MG/DL (ref 65–99)
HCT VFR BLD AUTO: 39.1 % (ref 37.5–51)
HGB BLD-MCNC: 12.9 G/DL (ref 13–17.7)
IMM GRANULOCYTES # BLD AUTO: 0.04 10*3/MM3 (ref 0–0.05)
IMM GRANULOCYTES NFR BLD AUTO: 0.4 % (ref 0–0.5)
LYMPHOCYTES # BLD AUTO: 1.13 10*3/MM3 (ref 0.7–3.1)
LYMPHOCYTES NFR BLD AUTO: 12.6 % (ref 19.6–45.3)
MCH RBC QN AUTO: 31.3 PG (ref 26.6–33)
MCHC RBC AUTO-ENTMCNC: 33 G/DL (ref 31.5–35.7)
MCV RBC AUTO: 94.9 FL (ref 79–97)
MONOCYTES # BLD AUTO: 0.47 10*3/MM3 (ref 0.1–0.9)
MONOCYTES NFR BLD AUTO: 5.3 % (ref 5–12)
NEUTROPHILS NFR BLD AUTO: 7.29 10*3/MM3 (ref 1.7–7)
NEUTROPHILS NFR BLD AUTO: 81.6 % (ref 42.7–76)
NRBC BLD AUTO-RTO: 0 /100 WBC (ref 0–0.2)
PLATELET # BLD AUTO: 170 10*3/MM3 (ref 140–450)
PMV BLD AUTO: 11.8 FL (ref 6–12)
POTASSIUM SERPL-SCNC: 5.4 MMOL/L (ref 3.5–5.2)
RBC # BLD AUTO: 4.12 10*6/MM3 (ref 4.14–5.8)
SODIUM SERPL-SCNC: 135 MMOL/L (ref 136–145)
WBC NRBC COR # BLD: 8.94 10*3/MM3 (ref 3.4–10.8)

## 2023-10-31 PROCEDURE — 85025 COMPLETE CBC W/AUTO DIFF WBC: CPT | Performed by: UROLOGY

## 2023-10-31 PROCEDURE — 99231 SBSQ HOSP IP/OBS SF/LOW 25: CPT | Performed by: SURGERY

## 2023-10-31 PROCEDURE — 80048 BASIC METABOLIC PNL TOTAL CA: CPT | Performed by: UROLOGY

## 2023-10-31 RX ORDER — HYDROCODONE BITARTRATE AND ACETAMINOPHEN 5; 325 MG/1; MG/1
1 TABLET ORAL EVERY 4 HOURS PRN
Qty: 20 TABLET | Refills: 0 | Status: SHIPPED | OUTPATIENT
Start: 2023-10-31

## 2023-10-31 RX ORDER — DOCUSATE SODIUM 100 MG/1
100 CAPSULE, LIQUID FILLED ORAL 2 TIMES DAILY PRN
Qty: 10 CAPSULE | Refills: 1 | Status: SHIPPED | OUTPATIENT
Start: 2023-10-31 | End: 2024-10-30

## 2023-10-31 RX ADMIN — OXYCODONE AND ACETAMINOPHEN 1 TABLET: 5; 325 TABLET ORAL at 06:20

## 2023-10-31 NOTE — PROGRESS NOTES
[facility]     Progress Note    Patient Name: Steve Flood  : 1975  MRN: [id number]  Primary Care Physician:  Alisha Gould MD  Date of admission: 10/30/2023    Subjective   Subjective     Chief Complaint: Umbilical hernia    HPI:  Patient Reports feeling well today.  Tolerating diet.  Pain is well controlled.  Was up and dressed and ready to go at the time I evaluated him.      Objective   Objective     Vitals:   Temp:  [97.1 °F (36.2 °C)-98.9 °F (37.2 °C)] 98.1 °F (36.7 °C)  Heart Rate:  [49-90] 55  Resp:  [11-18] 16  BP: (140-190)/() 143/76  FiO2 (%):  [85 %] 85 %    Physical Exam  Constitutional:       Appearance: Normal appearance.   Cardiovascular:      Rate and Rhythm: Normal rate.   Pulmonary:      Effort: Pulmonary effort is normal.   Abdominal:      Palpations: Abdomen is soft.         Result Review    Result Review:  I have personally reviewed the results from the time of this admission to 10/31/2023 09:56 EDT and agree with these findings:  [x]  Laboratory  []  Microbiology  []  Radiology  []  EKG/Telemetry   []  Cardiology/Vascular   []  Pathology  []  Old records  []  Other:  Most notable findings include: White count normal    Assessment & Plan   Assessment / Plan     Brief Patient Summary:  Steve Flood is a 48 y.o. 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    CODE STATUS:   Code Status (Patient has no pulse and is not breathing): CPR (Attempt to Resuscitate)  Medical Interventions (Patient has pulse or is breathing): Full  Support    Disposition:  I expect patient to be discharged today.    Electronically signed by Ronnie Madrigal MD, 10/31/23, 9:56 AM EDT.

## 2023-10-31 NOTE — SIGNIFICANT NOTE
10/31/23 5759   Plan   Plan SABINO, RN met with patient at bedside.  Patient ambulating in room with no assist.  Reports lives with his spouse.  Works and provides own IADL's.  Facesheet verified.  Reports no financial needs.  Meds to Beds.  Plans to dishcarge home with spouse and no needs

## 2023-10-31 NOTE — PLAN OF CARE
Goal Outcome Evaluation:  Plan of Care Reviewed With: patient        Progress: improving       Patient alert and able to make needs known. No current complaint of pain or discomfort. Discharge education complete and understanding voiced.

## 2023-10-31 NOTE — DISCHARGE SUMMARY
[facility]   DISCHARGE SUMMARY      Name:  Steve Flood   Age:  48 y.o.  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  He tolerated procedure well.  Please see operative report for further details.  Patient was admitted postoperatively for pain control and monitoring.  Patient remained stable during admission.  Postoperatively he was able to ambulate as well as tolerate a diet.   At time of discharge patient was ambulating without assist, tolerating regular diet, and pain was controlled with p.o. medications.  At this time all goals of inpatient care meant patient is deemed ready for discharge home     Procedures Performed:    Procedure(s):  UMBILICAL HERNIA REPAIR LAPAROSCOPIC WITH DAVINCI ROBOT  ADRENALECTOMY LAPAROSCOPIC WITH DAVINCI ROBOT       Consults:     Consults       No orders found for last 30 day(s).            Pertinent Test Results:     Lab Results (all)       Procedure Component Value Units Date/Time    Tissue Pathology Exam [[id number]] Collected: 10/30/23 1722    Specimen: Tissue from Gland, Adrenal Left Updated: 10/31/23 0836    Basic Metabolic Panel [[id number]]  (Abnormal) Collected: 10/31/23 0408    Specimen: Blood Updated: 10/31/23 0556     Glucose 98 mg/dL      BUN 10 mg/dL       "Creatinine 0.94 mg/dL      Sodium 135 mmol/L      Potassium 5.4 mmol/L      Chloride 100 mmol/L      CO2 27.5 mmol/L      Calcium 8.8 mg/dL      BUN/Creatinine Ratio 10.6     Anion Gap 7.5 mmol/L      eGFR 100.6 mL/min/1.73     Narrative:      GFR Normal >60  Chronic Kidney Disease <60  Kidney Failure <15      CBC & Differential [[id number]]  (Abnormal) Collected: 10/31/23 0408    Specimen: Blood Updated: 10/31/23 0528    Narrative:      The following orders were created for panel order CBC & Differential.  Procedure                               Abnormality         Status                     ---------                               -----------         ------                     CBC Auto Differential[[id number]]        Abnormal            Final result                 Please view results for these tests on the individual orders.    CBC Auto Differential [[id number]]  (Abnormal) Collected: 10/31/23 0408    Specimen: Blood Updated: 10/31/23 0528     WBC 8.94 10*3/mm3      RBC 4.12 10*6/mm3      Hemoglobin 12.9 g/dL      Hematocrit 39.1 %      MCV 94.9 fL      MCH 31.3 pg      MCHC 33.0 g/dL      RDW 13.8 %      RDW-SD 48.8 fl      MPV 11.8 fL      Platelets 170 10*3/mm3      Neutrophil % 81.6 %      Lymphocyte % 12.6 %      Monocyte % 5.3 %      Eosinophil % 0.0 %      Basophil % 0.1 %      Immature Grans % 0.4 %      Neutrophils, Absolute 7.29 10*3/mm3      Lymphocytes, Absolute 1.13 10*3/mm3      Monocytes, Absolute 0.47 10*3/mm3      Eosinophils, Absolute 0.00 10*3/mm3      Basophils, Absolute 0.01 10*3/mm3      Immature Grans, Absolute 0.04 10*3/mm3      nRBC 0.0 /100 WBC             Imaging Results (All)       None            Condition on Discharge:      good    Vital Signs:    /76 (BP Location: Left arm, Patient Position: Lying)   Pulse 55   Temp 98.1 °F (36.7 °C) (Oral)   Resp 16   Ht 182.9 cm (72\")   Wt 117 kg (258 lb 9.6 oz)   SpO2 98%   BMI 35.07 kg/m²     Discharge Disposition:    Home or Self " Care    Discharge Medication:       Discharge Medications        New Medications        Instructions Start Date   docusate sodium 100 MG capsule  Commonly known as: Colace   100 mg, Oral, 2 Times Daily PRN      HYDROcodone-acetaminophen 5-325 MG per tablet  Commonly known as: NORCO   1 tablet, Oral, Every 4 Hours PRN             Continue These Medications        Instructions Start Date   albuterol sulfate  (90 Base) MCG/ACT inhaler  Commonly known as: PROVENTIL HFA;VENTOLIN HFA;PROAIR HFA   2 puffs, Inhalation, Every 4 Hours PRN      ibuprofen 800 MG tablet  Commonly known as: ADVIL,MOTRIN   800 mg, Oral, Every 8 Hours PRN, for pain               Discharge Diet:     Diet Instructions       Diet: Regular/House Diet; Thin (IDDSI 0)      Discharge Diet: Regular/House Diet    Fluid Consistency: Thin (IDDSI 0)            Activity at Discharge:     Activity Instructions       Discharge Activity      Activity: You will likely feel tired for 1-2 weeks or longer after surgery.  Simple tasks may tire you.  We encourage you to nap during the day.  Your activity and energy will improve each day.     Try to walk as much as tolerated. It is ok to go up and down stairs as tolerated. Walk at least twice a day for 20 minutes at a time.  Start the day you leave the hospital and continue for 5 weeks after surgery.     Do not drive while taking prescription pain medication (narcotic).       Do not lift anything over 15 pounds until follow up.  15 pounds is about a gallon of milk.     Caring for your wound: Your wound may itch or feel a bit irritated.  It is normal to feel a firm ridge under the wound as it heals.  The scar will get smaller and slowly fade in 6-18 months. All of the stiches are dissovable and the surgical glue will come off over time.     You may shower.  Do not soak in a tub, pool or Jacuzzi for 4 weeks.     Recommend wearing loose pants with an elastic waistband to feel more comfortable.     Eating and  drinking:  You may have less of an appetite right after surgery.  Your appetite should steadily improve.  Drinking is more important than eating.  Be sure to drink plenty of fluids and stay hydrated.     Medications:   Follow the instructions provided to you at time of discharge regarding your home medications  Your pain should improve each day after surgery.  Take the prescription pain medication for severe pain as needed.  As your pain improves, it is important that you decrease the amount of prescription pain medicine as much as possible. Prescription pain medication is unable to be refilled over the phone.     Do not take additional Tylenol while taking the prescription pain medication.  It is okay to take ibuprofen while taking the prescription pain medication as needed for additional discomfort.  Once no longer taking prescription pain medicine, you may alternate between ibuprofen and Tylenol as needed.     Many patients experience constipation after surgery, which can cause significant discomfort. It may take up to a week to have a bowel movement. You should take a stool softener (Colace or Docusate) twice a day and should add Miralax once daily, if needed, until you are having bowel movements regularly.     Follow-up: You will be scheduled for follow-up next week       Notify our office if any of the following occurs:  Increased pain, redness, or swelling at the incision  Fever greater than 101.5°F  Persistent nausea, vomiting or inability to have bowel movements  Shortness of breath, calf pain, or swelling in your extremities    Other Restrictions (Specify)      No lifting greater than 15 pounds or strenuous activity for 4 weeks.  No driving if taking narcotic pain medication.            Follow-up Appointments:    Future Appointments   Date Time Provider Department Center   11/14/2023 11:30 AM Lupe Russo MD [facility]   11/14/2023  2:30 PM Ronnie Madrigal MD [facility]     Additional  Instructions for the Follow-ups that You Need to Schedule       Discharge Follow-up with Specialty: Karan; 2 Weeks   As directed      Specialty: Karan   Follow Up: 2 Weeks        Discharge Follow-up with Specified Provider: Dr. Russo (day when Dr. Madrigal sees as well); 2 Weeks   As directed      To: Dr. Russo (day when Dr. Madrigal sees as well)   Follow Up: 2 Weeks   Follow Up Details: [phone]        Discharge Follow-up with Specified Provider: Rohan; 2 Weeks   As directed      To: Rohan   Follow Up: 2 Weeks        Notify Physician or Go To The ED For the Following Conditions   As directed      Nausea, vomiting, fever, chills, and/or worsening or unrelenting abdominal pain.    Order Comments: Nausea, vomiting, fever, chills, and/or worsening or unrelenting abdominal pain.                 Test Results Pending at Discharge:    Pending Labs       Order Current Status    Tissue Pathology Exam In process               Lupe Russo MD  10/31/23  17:43 EDT

## 2023-10-31 NOTE — PAYOR COMM NOTE
"UR DEPARTMENT    Olimpia Lewis RN  Phone [phone]  Fax [phone]    [address] [place] [address]    NPI [id number]  TAX ID [id number]    PHYSICIAN NAME AND NPI  LUPE RUSSO  [id number]    BED TYPE  INPATIENT MED/SURG    TYPE OF ADMISSION: POST OP ADMISSION    DATE OF ADMISSION 10/30/2023      Zenaida Flood Junior (48 y.o. Male)       Date of Birth   1975    Social Security Number       Address   [address] [place] [address] [place] [address]    Home Phone   [phone]    MRN   [id number]       Mandaen   None    Marital Status                               Admission Date   10/30/23    Admission Type   Elective    Admitting Provider   Lupe Russo MD    Attending Provider   Lupe Russo MD    Department, Room/Bed   [facility] 5TH FLOOR SURGICAL TELEMETRY UNIT, 515/1       Discharge Date       Discharge Disposition       Discharge Destination                                 Attending Provider: Lupe Russo MD    Allergies: No Known Allergies    Isolation: None   Infection: None   Code Status: CPR    Ht: 182.9 cm (72\")   Wt: 117 kg (258 lb 9.6 oz)    Admission Cmt: None   Principal Problem: Umbilical hernia without obstruction and without gangrene [K42.9]                   Active Insurance as of 10/30/2023       Primary Coverage       Payor Plan Insurance Group Employer/Plan Group    R [id number]       Payor Plan Address Payor Plan Phone Number Payor Plan Fax Number Effective Dates    [address] [address] [phone]  1/1/2020 - None Entered    [facility] [id number]         Subscriber Name Subscriber Birth Date Member ID       ZENAIDA FLOOD JUNIOR 1975 [id number]                     Emergency Contacts        (Rel.) Home Phone Work Phone Mobile Phone    Dian FLOOD (Spouse) [phone] -- [phone]              Esparza: NPI [id number] Tax ID [id number]     History & Physical        Ronnie Madrigal MD at " 10/30/23 1039            H&P reviewed. The patient was examined and there are no changes to the H&P.          Electronically signed by Ronnie Madrigal MD at 10/30/23 1039   Source Note          Steve Flood is a 48 y.o. male presents to [facility] GENERAL SURGERY. The patient is to be seen for umbilical hernia.     The patient is to be seen for a large umbilical hernia on CT.     Umbilical hernia  The patient reports he has a large umbilical hernia. He states he was told it was a 3 cm mass on his right side. The patient reports he has been dealing with this pain. He states he is trying to get another CT scan with it. The patient reports he is more worried about that than the hernia. He states he has seen Dr. Gould for his adrenal mass. The patient reports he has an appointment scheduled on 08/01/2023 to get an MRI. He states he saw a surgeon in [place] and was informed that surgery would be 4 to 6 weeks at the earliest. The patient reports he took the MRI and they saw the hernia. He states they were going to call him with scheduling for everything, but the hernia was taking an number of weeks. The patient reports he was told he was seeing someone at [facility] for the adrenal masses.           Objective   Medical History   History reviewed. No pertinent past medical history.      Surgical History         Past Surgical History:   Procedure Laterality Date    JOINT REPLACEMENT                No current outpatient medications on file.     No Known Allergies            Family History   Problem Relation Age of Onset    Diabetes Mother           Social History   Social History            Socioeconomic History    Marital status:    Tobacco Use    Smoking status: Every Day       Packs/day: 1.00       Years: 10.00       Pack years: 10.00       Types: Cigarettes    Smokeless tobacco: Never   Vaping Use    Vaping Use: Never used   Substance and Sexual Activity    Alcohol use: Not  "Currently       Comment: Only on special occasions    Drug use: Never    Sexual activity: Yes       Partners: Female            Vital Signs:   Resp 16   Ht 177.8 cm (70\")   BMI 35.02 kg/m²      Physical Exam  Constitutional:       General: He is not in acute distress.  Pulmonary:      Breath sounds: Normal breath sounds.   Abdominal:      Palpations: Abdomen is soft.         It likely has an inferior to the umbilicus hernia. Given body habitus, it actually was not clear. I could not clearly feel a umbilical hernia, but I did feel like he had some bulging just inferior to the umbilicus, but his umbilicus actually appeared normal, but he does have a CT which shows a large fat containing umbilical hernia that was from [place] [facility].           Result Review   :               Procedures            Assessment   Assessment and Plan    There are no diagnoses linked to this encounter.     1. Patient with a symptomatic umbilical hernia.     - Patient has a fairly large 3 cm adrenal mass and I am not sure how this is being addressed or worked up. We will need to get in touch with his PCP and see if they have made any referrals for this adrenal mass. I think if there is plans to operate on the adrenal mass, we could potentially do his umbilical hernia repair in conjunction with the adrenalectomy, but he likely needs some work-up for that prior to getting him scheduled for an umbilical hernia repair. Risks, benefits, alternatives of the umbilical hernia repair were discussed extensively. All questions were answered. Patient voiced understanding and agreed to proceed with the above plan.        Follow Up   No follow-ups on file.  Patient was given instructions and counseling regarding his condition or for health maintenance advice. Please see specific information pulled into the AVS if appropriate.         Transcribed from ambient dictation for Ronnie Madrigal MD by Ludwin Canhcola.  07/20/23   12:38 EDT     Patient or " patient representative verbalized consent to the visit recording.  I have personally performed the services described in this document as transcribed by the above individual, and it is both accurate and complete.           Electronically signed by Ronnie Madrigal MD at 10/30/23 1039                 Ronnie Madrigal MD at 10/30/23 1039          Steve Flood is a 48 y.o. male presents to [facility] GENER[facility] SUR[facility]ERY. The patient is to be seen for umbilical hernia.     The patient is to be seen for a large umbilical hernia on CT.     Umbilical hernia  The patient reports he has a large umbilical hernia. He states he was told it was a 3 cm mass on his right side. The patient reports he has been dealing with this pain. He states he is trying to get another CT scan with it. The patient reports he is more worried about that than the hernia. He states he has seen Dr. Gould for his adrenal mass. The patient reports he has an appointment scheduled on 08/01/2023 to get an MRI. He states he saw a surgeon in [place] and was informed that surgery would be 4 to 6 weeks at the earliest. The patient reports he took the MRI and they saw the hernia. He states they were going to call him with scheduling for everything, but the hernia was taking an number of weeks. The patient reports he was told he was seeing someone at [facility] for the adrenal masses.           Objective   Medical History   History reviewed. No pertinent past medical history.        Surgical History         Past Surgical History:   Procedure Laterality Date    JOINT REPLACEMENT                No current outpatient medications on file.     No Known Allergies            Family History   Problem Relation Age of Onset    Diabetes Mother           Social History   Social History            Socioeconomic History    Marital status:    Tobacco Use    Smoking status: Every Day       Packs/day: 1.00       Years: 10.00       Pack  "years: 10.00       Types: Cigarettes    Smokeless tobacco: Never   Vaping Use    Vaping Use: Never used   Substance and Sexual Activity    Alcohol use: Not Currently       Comment: Only on special occasions    Drug use: Never    Sexual activity: Yes       Partners: Female            Vital Signs:   Resp 16   Ht 177.8 cm (70\")   BMI 35.02 kg/m²      Physical Exam  Constitutional:       General: He is not in acute distress.  Pulmonary:      Breath sounds: Normal breath sounds.   Abdominal:      Palpations: Abdomen is soft.         It likely has an inferior to the umbilicus hernia. Given body habitus, it actually was not clear. I could not clearly feel a umbilical hernia, but I did feel like he had some bulging just inferior to the umbilicus, but his umbilicus actually appeared normal, but he does have a CT which shows a large fat containing umbilical hernia that was from [place] [facility].           Result Review   :               Procedures            Assessment   Assessment and Plan    There are no diagnoses linked to this encounter.     1. Patient with a symptomatic umbilical hernia.     - Patient has a fairly large 3 cm adrenal mass and I am not sure how this is being addressed or worked up. We will need to get in touch with his PCP and see if they have made any referrals for this adrenal mass. I think if there is plans to operate on the adrenal mass, we could potentially do his umbilical hernia repair in conjunction with the adrenalectomy, but he likely needs some work-up for that prior to getting him scheduled for an umbilical hernia repair. Risks, benefits, alternatives of the umbilical hernia repair were discussed extensively. All questions were answered. Patient voiced understanding and agreed to proceed with the above plan.        Follow Up   No follow-ups on file.  Patient was given instructions and counseling regarding his condition or for health maintenance advice. Please see specific information pulled " into the AVS if appropriate.         Transcribed from ambient dictation for Ronnie Madrigal MD by Ludwin Canchola.  07/20/23   12:38 EDT     Patient or patient representative verbalized consent to the visit recording.  I have personally performed the services described in this document as transcribed by the above individual, and it is both accurate and complete.           Electronically signed by Ronnie Madrigal MD at 10/30/23 1039       Current Facility-Administered Medications   Medication Dose Route Frequency Provider Last Rate Last Admin    acetaminophen (TYLENOL) tablet 650 mg  650 mg Oral Q4H PRN Lupe Russo MD        Or    acetaminophen (TYLENOL) suppository 650 mg  650 mg Rectal Q4H PRN Lupe Russo MD        albuterol sulfate HFA (PROVENTIL HFA;VENTOLIN HFA;PROAIR HFA) inhaler 2 puff  2 puff Inhalation Q4H PRN Lupe Russo MD        docusate sodium (COLACE) capsule 100 mg  100 mg Oral BID PRN Lupe Russo MD        ketorolac (TORADOL) injection 15 mg  15 mg Intravenous Q6H PRN Lupe Russo MD   15 mg at 10/30/23 2326    lactated ringers infusion  100 mL/hr Intravenous Continuous Lupe Russo MD        lactated ringers infusion  9 mL/hr Intravenous Continuous PRN Lupe Russo  mL/hr at 10/30/23 1516 Restarted at 10/30/23 1936    lactated ringers infusion  100 mL/hr Intravenous Continuous Lupe Russo  mL/hr at 10/30/23 2326 100 mL/hr at 10/30/23 2326    morphine injection 2 mg  2 mg Intravenous Q2H PRN Lupe Russo MD   2 mg at 10/30/23 2108    And    naloxone (NARCAN) injection 0.4 mg  0.4 mg Intravenous Q5 Min PRN Lupe Russo MD        nicotine (NICODERM CQ) 21 MG/24HR patch 1 patch  1 patch Transdermal Q24H Lupe Russo MD   1 patch at 10/30/23 2325    ondansetron (ZOFRAN) injection 4 mg  4 mg Intravenous Q6H PRN Lupe Russo MD   4 mg at 10/30/23 2112    ondansetron (ZOFRAN) tablet 4 mg  4 mg Oral Q6H PRN Karan,  MD Lupe        Or    ondansetron (ZOFRAN) injection 4 mg  4 mg Intravenous Q6H PRN Lupe Russo MD        oxyCODONE-acetaminophen (PERCOCET)  MG per tablet 1 tablet  1 tablet Oral Q4H PRN Lupe Russo MD        oxyCODONE-acetaminophen (PERCOCET) 5-325 MG per tablet 1 tablet  1 tablet Oral Q4H PRN Lupe Russo MD   1 tablet at 10/31/23 0620    sodium chloride 0.9 % flush 10 mL  10 mL Intravenous Q12H Lupe Russo MD   10 mL at 10/30/23 2326    sodium chloride 0.9 % flush 10 mL  10 mL Intravenous PRN Lupe Russo MD        sodium chloride 0.9 % infusion 40 mL  40 mL Intravenous Lupe Graves MD            Operative/Procedure Notes (last 24 hours)        Ronnie Madrigal MD at 10/30/23 1607          Preoperative diagnosis: Umbilical hernia    Postoperative diagnosis: Same    Procedure: Robotic umbilical hernia repair    Surgeon: Rohan    Anesthesia: General    Assistant: Valerie Alvarez    EBL: 3 mL from our portion of the case    Specimens: None    Complications: None    Indications: 48-year-old male who presented with an umbilical hernia as well as a adrenal mass.  Presents today for elective hernia repair and adrenalectomy.    PROCEDURE    Patient was seen in the preoperative holding area.  Risks, benefits, alternatives of the operation were again discussed in detail.  All of the patient and family's questions were answered.  They voiced understanding, and agreed to proceed.    Patient was brought to the operating room.  Monitoring devices and [place] stockings were placed.  Anesthesia was administered.  Patient was prepped and draped in standard surgical fashion.  After prepping and draping a timeout was performed to verify both the correct patient and correct procedure.    We assumed the case from Dr. Russo after adrenalectomy had been performed.  We were able to use 2 of her trocars for the robotic arms but I did have to reposition the camera port because it  was too close to the hernia defect.  After injection of local anesthesia under direct visualization an 8 mm trocar was placed in the left lateral side and this was repositioned at one other time to make space for the camera and the mesh.    We then brought in and docked the robot.  Once the robot was in appropriate position, we began by reducing any contents that were incarcerated in the umbilical hernia.  We then dissected into the preperitoneal space, making sure we reduced all contents from the umbilical hernia.  Once everything was completely reduced from the umbilical hernia and we had a complete and adequate exposure, we closed the hernia defect primarily with a running 0 V-Loc suture.  Once the defect was closed we then brought in a piece of phasix ST mesh.  It was Miguel centimeters.  A chandelier stitch had been placed in the mesh, the chandelier stitch was grasped with a Reymundo-Glen device through the center of the umbilical defect and pulled up flush with the anterior abdominal wall.  The mesh was then sewn into place circumferentially with multiple 3-0 V-Loc sutures.  We inspected the mesh to make sure it was sewn in 360 degrees with no gaps or folds.  Satisfied with the primary repair and coverage with the mesh, we then inspected the abdomen making sure everything appeared to be hemostatic and there were no obvious underlying injuries.  We then removed the robotic arms and undocked the robot.    Dr. Russo had left the specimen in situ.  I remove the specimen through the left upper quadrant trocar.  I did have to extend the incision slightly in order to get the specimen out.  Specimen came out tacked in the bag.  Specimen was passed off for pathology.  We closed this site and a couple of the 8 mm trocar sites with a Reymundo-Glen device and 0 Vicryl suture under direct visualization.  The remaining trocars were then removed under direct visualization, and the abdomen was desufflated.  Skin  incisions were closed with subcuticular 4-0 Vicryl stitch and dressed with skin glue.    The patient was then aroused from anesthesia, taken off the OR table, and taken to PACU in stable condition.  Sponge, needle, and instrument counts were correct x2.  Valerie Alvarez was present for the entire procedure and helped in all portions of the case.    Assistant: Tod Parr RN CSA; Valerie Alvarez CSA was responsible for performing the following activities: Retraction, Suturing, Closing, Placing Dressing, and Held/Positioned Camera and their skilled assistance was necessary for the success of this case.        The operative note was dictated with the help of the dragon dictation system.      Electronically signed by Ronnie Madrigal MD at 10/30/23 4070       Lupe Russo MD at 10/30/23 1265          Preoperative diagnosis  Left adrenal mass    Postoperative diagnosis  Left adrenal mass    Procedure performed  Left robotic assisted laparoscopic adrenalectomy    Attending surgeon  Lupe Russo MD     Assistant  Tod Parr RN    Anesthesia  General    EBL  25 mL    Complications  None    Specimen  Left adrenal    Findings  Left adrenal gland with adjacent cystic structure with visible adrenal tissue; completely excised    Indications  48 y.o. male agreed to undergo the above named procedure after discussion of the alternatives, risks and benefits.   Informed consent was obtained.      Procedure  The patient properly identified, brought to the Operating Room. Following induction of general anesthesia, the patient was placed in the left flank position at the intersection to the twelfth rib. At the umbilicus, a small vertical incision was made with an 11 blade scalpel, through which we gained pneumoperitoneum with help of a Veress needle. A drop test was used to confirm its proper placement. Visual trocar with 0 degree laparoscopic lens was used to obtain access into the abdomen. There was no evidence of  bowel or vascular injury. There were no adhesions and careful inspection was performed prior to insertion of the ports. In a triangular fashion towards the left upper quadrant, two 8 millimeter robot trocars were placed. In the mid-line just above the camera port, a 5 millimeter assistant port was placed. We docked the robot using a 30 degree  lens. The colon was mobilized along the white line of Toldt. Once this was reflected, identification of the kidney and adrenal gland was made. The adrenal gland with a large cystic structure was circumferentially dissected until freed.  The adrenal vein was controlled with the vessel sealer and clips.  It was then placed in an endocatch bag. Repeated inspection of the hilum, there was adequate hemostasis after observing for several minutes; the field was covered with Elo hemostatic agent.  The robot was then undocked.   The case was then passed to the general surgery team, Dr. Madrigal, who will extract the specimen and perform robotic umbilical hernia repair.  Please see subsequent operative report for details of the remainder of the procedure.   All needle and sponge counts were correct x 2 for this portion of the procedure.     The first assist, Tod Parr RN, was present and actively participated throughout the case.          Signed:  Lupe Russo MD  10/30/23  17:44 EDT      Electronically signed by Lupe Russo MD at 10/30/23 1752          Physician Progress Notes (last 24 hours)        Lupe Russo MD at 10/30/23 1050          Plan to perform left robotic adrenalectomy in combination with Dr. Madrigal.  Please see surgery H&P for details.  Risks, benefits, and alternatives were discussed with him at length prior to today.  Adequate time was provided for questions.  Hormonal work-up was performed and was negative.  Patient aware that his right adrenal gland appears normal.  Given normal-appearing contralateral adrenal gland, low risk for him to  require any type of ongoing treatment for replacement therapy.  Risks discussed including but not limited to bleeding, infection, damage to surrounding structure, pain, benign or malignant pathology, endocrine changes, and need for further procedures.  Patient also aware that this is a procedure performed under anesthesia which has inherent risks including and up to death.  Patient notes understanding the risks and is agreeable to proceed.  All questions addressed    Electronically signed by Lupe Russo MD at 10/30/23 1053       Consult Notes (last 24 hours)  Notes from 10/30/23 0817 through 10/31/23 0817   No notes of this type exist for this encounter.

## 2023-10-31 NOTE — PLAN OF CARE
Goal Outcome Evaluation:      Pt arrived to the floor, pain level 10 treated with PRN morphine and later PRN toradol. Pain was well controlled until 0620 this morning. Pain score was 3-4 and treated with PRN percocet. VSS. Occasional bradycardic.

## 2023-11-01 DIAGNOSIS — E27.8 ADRENAL MASS: Primary | ICD-10-CM

## 2023-11-01 DIAGNOSIS — K42.9 UMBILICAL HERNIA WITHOUT OBSTRUCTION AND WITHOUT GANGRENE: ICD-10-CM

## 2023-11-01 RX ORDER — OXYCODONE HYDROCHLORIDE AND ACETAMINOPHEN 5; 325 MG/1; MG/1
1-2 TABLET ORAL EVERY 6 HOURS PRN
Qty: 12 TABLET | Refills: 0 | Status: SHIPPED | OUTPATIENT
Start: 2023-11-01

## 2023-11-06 LAB
CYTO UR: NORMAL
LAB AP CASE REPORT: NORMAL
LAB AP CLINICAL INFORMATION: NORMAL
PATH REPORT.FINAL DX SPEC: NORMAL
PATH REPORT.GROSS SPEC: NORMAL

## 2023-11-14 ENCOUNTER — OFFICE VISIT (OUTPATIENT)
Dept: SURGERY | Facility: CLINIC | Age: 48
End: 2023-11-14
Payer: COMMERCIAL

## 2023-11-14 ENCOUNTER — OFFICE VISIT (OUTPATIENT)
Dept: UROLOGY | Facility: CLINIC | Age: 48
End: 2023-11-14
Payer: COMMERCIAL

## 2023-11-14 VITALS
SYSTOLIC BLOOD PRESSURE: 149 MMHG | HEART RATE: 89 BPM | WEIGHT: 248.4 LBS | TEMPERATURE: 97.9 F | DIASTOLIC BLOOD PRESSURE: 97 MMHG | HEIGHT: 71 IN | BODY MASS INDEX: 34.77 KG/M2 | RESPIRATION RATE: 16 BRPM

## 2023-11-14 VITALS — TEMPERATURE: 98 F | HEIGHT: 71 IN | BODY MASS INDEX: 34.58 KG/M2 | WEIGHT: 247 LBS

## 2023-11-14 DIAGNOSIS — D35.02 ADRENAL CORTICAL ADENOMA OF LEFT ADRENAL GLAND: Primary | ICD-10-CM

## 2023-11-14 DIAGNOSIS — K42.9 UMBILICAL HERNIA WITHOUT OBSTRUCTION AND WITHOUT GANGRENE: Primary | ICD-10-CM

## 2023-11-14 PROCEDURE — 99024 POSTOP FOLLOW-UP VISIT: CPT | Performed by: SURGERY

## 2023-11-14 PROCEDURE — 99024 POSTOP FOLLOW-UP VISIT: CPT | Performed by: UROLOGY

## 2023-11-14 NOTE — PROGRESS NOTES
UROLOGY OFFICE follow up NOTE    Subjective   HPI  Steve Flood is a 48 y.o. male who presents to the clinic today on 11/14/2023 for a follow-up left robotic adrenalectomy on 10/30/2023.    The patient is doing well.  Notes that the deep he had prior surgery resolved.  Very pleased with these results.    He has not followed up with Dr. Madrigal regarding his hernia, but he has an appointment with Dr. Madrigal today.. The patient began experiencing a sharp pain in his side. He did not do any heavy lifting. He would like to wait until he sees Dr. Madrigal before doing any further treatment.     Overall, patient thinks he is doing very well.    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)      Results for orders placed or performed during the hospital encounter of 10/30/23   Basic Metabolic Panel    Specimen: Blood   Result Value Ref Range    Glucose 98 65 - 99 mg/dL    BUN 10 6 - 20 mg/dL    Creatinine 0.94 0.76 - 1.27 mg/dL    Sodium 135 (L) 136 - 145 mmol/L    Potassium 5.4 (H) 3.5 - 5.2 mmol/L    Chloride 100 98 - 107 mmol/L    CO2 27.5 22.0 - 29.0 mmol/L    Calcium 8.8 8.6 - 10.5 mg/dL    BUN/Creatinine Ratio 10.6 7.0 - 25.0    Anion Gap 7.5 5.0 - 15.0 mmol/L    eGFR 100.6 >60.0 mL/min/1.73   CBC Auto Differential    Specimen: Blood   Result Value Ref Range    WBC 8.94 3.40 - 10.80 10*3/mm3    RBC 4.12 (L) 4.14 - 5.80 10*6/mm3    Hemoglobin 12.9 (L) 13.0 - 17.7 g/dL    Hematocrit 39.1 37.5 - 51.0 %    MCV 94.9 79.0 - 97.0 fL    MCH 31.3 26.6 - 33.0 pg    MCHC 33.0 31.5 - 35.7 g/dL    RDW 13.8 12.3 - 15.4 %    RDW-SD 48.8 37.0 - 54.0 fl    MPV 11.8 6.0 - 12.0 fL    Platelets 170 140 - 450 10*3/mm3    Neutrophil % 81.6 (H) 42.7 - 76.0 %    Lymphocyte % 12.6 (L) 19.6 - 45.3 %    Monocyte % 5.3 5.0 - 12.0 %    Eosinophil % 0.0 (L) 0.3 - 6.2 %    Basophil % 0.1 0.0 - 1.5 %    Immature Grans % 0.4 0.0 - 0.5 %    Neutrophils,  "Absolute 7.29 (H) 1.70 - 7.00 10*3/mm3    Lymphocytes, Absolute 1.13 0.70 - 3.10 10*3/mm3    Monocytes, Absolute 0.47 0.10 - 0.90 10*3/mm3    Eosinophils, Absolute 0.00 0.00 - 0.40 10*3/mm3    Basophils, Absolute 0.01 0.00 - 0.20 10*3/mm3    Immature Grans, Absolute 0.04 0.00 - 0.05 10*3/mm3    nRBC 0.0 0.0 - 0.2 /100 WBC   Tissue Pathology Exam    Specimen: Gland, Adrenal Left; Tissue   Result Value Ref Range    Case Report       Surgical Pathology Report                         Case: [id number]                                  Authorizing Provider:  Lupe Russo MD      Collected:           10/30/2023 05:22 PM          Ordering Location:     [facility] MAIN Received:            10/31/2023 08:36 AM                                 OR                                                                           Pathologist:           Melanie Vuong DO                                                       Specimen:    Gland, Adrenal Left, LEFT ADRENAL GLAND                                                    Clinical Information       Umbilical hernia without obstruction and without gangrene      Final Diagnosis       Adrenal Gland, left, resection:  -  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 See attached scanned consultation report for additional information and comment.        Gross Description       1. Gland, Adrenal Left.  Received in formalin and labeled \"left adrenal gland\" is a 25 g, 6.0 x 3.7 x 3.5 cm slightly disrupted left adrenal gland with up to 1.0 cm of attached adipose tissue.  The capsule (inked blue) is focally disrupted.  Sectioning reveals a 3.5 x 3.0 x 2.6 cm tan, friable, ill-defined, necrotic, focally calcified mass possibly arising from the medulla and abutting the nearest soft tissue margin.  The remaining uninvolved " "cortex displays a bright orange, soft cut surface.  Representative sections are submitted in 4 cassettes as follows: 1A-1B-mass, following decalcification; 1C-mass with uninvolved section; 1D-mass to adjacent uninvolved. FLAKO        Microscopic Description       Microscopic examination performed.         Review of systems  A review of systems was performed, and positive findings are noted in the HPI.    Objective     Vital Signs:   /97   Pulse 89   Temp 97.9 °F (36.6 °C)   Resp 16   Ht 180.3 cm (71\")   Wt 113 kg (248 lb 6.4 oz)   BMI 34.64 kg/m²       Physical exam  No acute distress, well-nourished  Awake alert and oriented  Mood normal; affect normal  Abdomen: Soft, nondistended, nontender; incisions clean, dry, intact    Problem List:  Patient Active Problem List   Diagnosis    Umbilical hernia without obstruction and without gangrene    Osteoarthritis resulting from right hip dysplasia    Adrenal mass 1 cm to 4 cm in diameter    Adrenal mass       Assessment & Plan   Diagnoses and all orders for this visit:    1. 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     At this point, no further urologic management anticipated  Encouraged to follow-up as needed   all questions and concerns have been addressed at this time.        Transcribed from ambient dictation for Lupe Russo MD by Gladis Singletary.  11/14/23   16:15 EST    Patient or patient representative verbalized consent to the visit recording.  I have personally performed the services described in this document as transcribed by the above individual, and it is both accurate and complete.   "

## 2023-11-14 NOTE — PROGRESS NOTES
Chief Complaint: Post-op    Subjective         History of Present Illness  Steve Flood is a 48 y.o. male presents to [facility] GENERAL SURGERY. The patient is to be seen for follow-up after umbilical hernia repair and concomitant removal of adrenal mass.    Status post robotic umbilical hernia repair with adrenalectomy  The patient reports he saw Dr. Lupe Russo this morning. He states he felt mild, intermittent pain in his umbilicus only on yesterday, 11/13/2023. He reports initial tightness, which eventually loosened up. He denies any strenuous activity to pull his hernia.    Objective     Past Medical History:   Diagnosis Date    Adrenal mass     Asthma     INHALER PRN    Umbilical hernia        Past Surgical History:   Procedure Laterality Date    ADRENALECTOMY Left 10/30/2023    Procedure: ADRENALECTOMY LAPAROSCOPIC WITH DAVINCI ROBOT;  Surgeon: Lupe Russo MD;  Location: [facility];  Service: Robotics - [employer];  Laterality: Left;    JOINT REPLACEMENT Right     HIP    VENTRAL HERNIA REPAIR N/A 10/30/2023    Procedure: UMBILICAL HERNIA REPAIR LAPAROSCOPIC WITH DAVINCI ROBOT;  Surgeon: Ronnie Madrigal MD;  Location: [facility];  Service: Robotics - [employer];  Laterality: N/A;         Current Outpatient Medications:     albuterol sulfate  (90 Base) MCG/ACT inhaler, Inhale 2 puffs Every 4 (Four) Hours As Needed for Wheezing., Disp: , Rfl:     docusate sodium (Colace) 100 MG capsule, Take 1 capsule by mouth 2 (Two) Times a Day As Needed for Constipation., Disp: 10 capsule, Rfl: 1    HYDROcodone-acetaminophen (NORCO) 5-325 MG per tablet, Take 1 tablet by mouth Every 4 (Four) Hours As Needed (Pain). (Patient not taking: Reported on 11/14/2023), Disp: 20 tablet, Rfl: 0    ibuprofen (ADVIL,MOTRIN) 800 MG tablet, Take 1 tablet by mouth Every 8 (Eight) Hours As Needed. for pain (Patient not taking: Reported on 11/14/2023), Disp: , Rfl:     oxyCODONE-acetaminophen  (PERCOCET) 5-325 MG per tablet, Take 1-2 tablets by mouth Every 6 (Six) Hours As Needed for Severe Pain. (Patient not taking: Reported on 11/14/2023), Disp: 12 tablet, Rfl: 0    No Known Allergies     Family History   Problem Relation Age of Onset    Diabetes Mother        Social History     Socioeconomic History    Marital status:    Tobacco Use    Smoking status: Every Day     Packs/day: 1.00     Years: 10.00     Additional pack years: 0.00     Total pack years: 10.00     Types: Cigarettes    Smokeless tobacco: Never    Tobacco comments:     Pt's last cigarette was at midnight   Vaping Use    Vaping Use: Never used   Substance and Sexual Activity    Alcohol use: Not Currently    Drug use: Never    Sexual activity: Defer        Physical Exam  Vitals and nursing note reviewed.   Constitutional:       General: He is not in acute distress.     Appearance: Normal appearance. He is well-developed and normal weight.   Cardiovascular:      Rate and Rhythm: Normal rate and regular rhythm.   Pulmonary:      Effort: Pulmonary effort is normal.      Breath sounds: Normal air entry. No wheezing.   Abdominal:      General: Bowel sounds are normal. There is no distension.      Palpations: Abdomen is soft.      Tenderness: There is no abdominal tenderness.      Hernia: No hernia is present.   Skin:     General: Skin is warm and dry.      Findings: No erythema.      Comments: Surgical Incision Healing Well   Neurological:      Mental Status: He is alert and oriented to person, place, and time.      Motor: Motor function is intact.   Psychiatric:         Mood and Affect: Mood normal.        Post Surgical Incision  Surgical wound: Incision is healing well. No signs of infection. No signs of recurrence of the hernia even with Valsalva.    Result Review :               Assessment and Plan    Assessment  The patient is status post robotic umbilical hernia repair with adrenalectomy by Dr. Russo.    Plan  He is doing well. He is  expected and pleased with his overall progress. At this point in time, he can follow up with me as needed and call with any questions or concerns. Discussed with the patient. All questions were answered. He voiced understanding and agreed to proceed with the plan.      Follow Up   No follow-ups on file.  Patient was given instructions and counseling regarding his condition or for health maintenance advice. Please see specific information pulled into the AVS if appropriate.       Transcribed from ambient dictation for Ronnie Madrigal MD by Alisha Ghotra.  11/14/23   15:35 EST    Patient or patient representative verbalized consent to the visit recording.  I have personally performed the services described in this document as transcribed by the above individual, and it is both accurate and complete.    Topical Ketoconazole Counseling: Patient counseled that this medication may cause skin irritation or allergic reactions.  In the event of skin irritation, the patient was advised to reduce the amount of the drug applied or use it less frequently.   The patient verbalized understanding of the proper use and possible adverse effects of ketoconazole.  All of the patient's questions and concerns were addressed.

## 2024-04-04 NOTE — ANESTHESIA PREPROCEDURE EVALUATION
Anesthesia Evaluation     Patient summary reviewed and Nursing notes reviewed                Airway   Mallampati: I  TM distance: >3 FB  Neck ROM: full  No difficulty expected  Dental          Pulmonary - normal exam    breath sounds clear to auscultation  (+) asthma,  Cardiovascular - negative cardio ROS and normal exam    Rhythm: regular  Rate: normal        Neuro/Psych- negative ROS  GI/Hepatic/Renal/Endo    (+) obesity    Musculoskeletal     Abdominal    Substance History - negative use     OB/GYN negative ob/gyn ROS         Other   arthritis,                 Anesthesia Plan    ASA 2     general     intravenous induction     Anesthetic plan, risks, benefits, and alternatives have been provided, discussed and informed consent has been obtained with: patient.    CODE STATUS:          Belongings in [facility] 4     Gertrudis Don, ANAHI  04/03/24 [id number]
